# Patient Record
Sex: MALE | Race: ASIAN | NOT HISPANIC OR LATINO | URBAN - METROPOLITAN AREA
[De-identification: names, ages, dates, MRNs, and addresses within clinical notes are randomized per-mention and may not be internally consistent; named-entity substitution may affect disease eponyms.]

---

## 2022-02-21 ENCOUNTER — INPATIENT (INPATIENT)
Facility: HOSPITAL | Age: 42
LOS: 2 days | Discharge: HOME | End: 2022-02-24
Attending: STUDENT IN AN ORGANIZED HEALTH CARE EDUCATION/TRAINING PROGRAM | Admitting: STUDENT IN AN ORGANIZED HEALTH CARE EDUCATION/TRAINING PROGRAM
Payer: COMMERCIAL

## 2022-02-21 VITALS
SYSTOLIC BLOOD PRESSURE: 111 MMHG | HEART RATE: 101 BPM | OXYGEN SATURATION: 100 % | DIASTOLIC BLOOD PRESSURE: 73 MMHG | RESPIRATION RATE: 20 BRPM | TEMPERATURE: 98 F

## 2022-02-21 LAB
ALBUMIN SERPL ELPH-MCNC: 3.9 G/DL — SIGNIFICANT CHANGE UP (ref 3.3–5)
ALP SERPL-CCNC: 96 U/L — SIGNIFICANT CHANGE UP (ref 40–120)
ALT FLD-CCNC: 18 U/L — SIGNIFICANT CHANGE UP (ref 4–41)
ANION GAP SERPL CALC-SCNC: 17 MMOL/L — HIGH (ref 7–14)
APPEARANCE UR: CLEAR — SIGNIFICANT CHANGE UP
APTT BLD: 27.4 SEC — SIGNIFICANT CHANGE UP (ref 27–36.3)
AST SERPL-CCNC: 18 U/L — SIGNIFICANT CHANGE UP (ref 4–40)
BASE EXCESS BLDV CALC-SCNC: -4.9 MMOL/L — LOW (ref -2–3)
BASOPHILS # BLD AUTO: 0 K/UL — SIGNIFICANT CHANGE UP (ref 0–0.2)
BASOPHILS NFR BLD AUTO: 0 % — SIGNIFICANT CHANGE UP (ref 0–2)
BILIRUB SERPL-MCNC: 0.4 MG/DL — SIGNIFICANT CHANGE UP (ref 0.2–1.2)
BILIRUB UR-MCNC: NEGATIVE — SIGNIFICANT CHANGE UP
BLOOD GAS VENOUS COMPREHENSIVE RESULT: SIGNIFICANT CHANGE UP
BUN SERPL-MCNC: 10 MG/DL — SIGNIFICANT CHANGE UP (ref 7–23)
CALCIUM SERPL-MCNC: 8.8 MG/DL — SIGNIFICANT CHANGE UP (ref 8.4–10.5)
CHLORIDE BLDV-SCNC: 107 MMOL/L — SIGNIFICANT CHANGE UP (ref 96–108)
CHLORIDE SERPL-SCNC: 101 MMOL/L — SIGNIFICANT CHANGE UP (ref 98–107)
CO2 BLDV-SCNC: 19.1 MMOL/L — LOW (ref 22–26)
CO2 SERPL-SCNC: 19 MMOL/L — LOW (ref 22–31)
COLOR SPEC: YELLOW — SIGNIFICANT CHANGE UP
CREAT SERPL-MCNC: 1.02 MG/DL — SIGNIFICANT CHANGE UP (ref 0.5–1.3)
DIFF PNL FLD: ABNORMAL
EOSINOPHIL # BLD AUTO: 0 K/UL — SIGNIFICANT CHANGE UP (ref 0–0.5)
EOSINOPHIL NFR BLD AUTO: 0 % — SIGNIFICANT CHANGE UP (ref 0–6)
FLUAV AG NPH QL: SIGNIFICANT CHANGE UP
FLUBV AG NPH QL: SIGNIFICANT CHANGE UP
GAS PNL BLDV: 135 MMOL/L — LOW (ref 136–145)
GAS PNL BLDV: SIGNIFICANT CHANGE UP
GLUCOSE BLDV-MCNC: 108 MG/DL — HIGH (ref 70–99)
GLUCOSE SERPL-MCNC: 170 MG/DL — HIGH (ref 70–99)
GLUCOSE UR QL: ABNORMAL
HCO3 BLDV-SCNC: 18 MMOL/L — LOW (ref 22–29)
HCT VFR BLD CALC: 45.4 % — SIGNIFICANT CHANGE UP (ref 39–50)
HCT VFR BLDA CALC: 41 % — SIGNIFICANT CHANGE UP (ref 39–51)
HGB BLD CALC-MCNC: 13.8 G/DL — SIGNIFICANT CHANGE UP (ref 13–17)
HGB BLD-MCNC: 15.7 G/DL — SIGNIFICANT CHANGE UP (ref 13–17)
HIV 1+2 AB+HIV1 P24 AG SERPL QL IA: SIGNIFICANT CHANGE UP
IANC: 16.57 K/UL — HIGH (ref 1.5–8.5)
INR BLD: 1.18 RATIO — HIGH (ref 0.88–1.16)
KETONES UR-MCNC: NEGATIVE — SIGNIFICANT CHANGE UP
LACTATE BLDV-MCNC: 1.2 MMOL/L — SIGNIFICANT CHANGE UP (ref 0.5–2)
LEUKOCYTE ESTERASE UR-ACNC: ABNORMAL
LYMPHOCYTES # BLD AUTO: 0.46 K/UL — LOW (ref 1–3.3)
LYMPHOCYTES # BLD AUTO: 2.5 % — LOW (ref 13–44)
MCHC RBC-ENTMCNC: 29.9 PG — SIGNIFICANT CHANGE UP (ref 27–34)
MCHC RBC-ENTMCNC: 34.6 GM/DL — SIGNIFICANT CHANGE UP (ref 32–36)
MCV RBC AUTO: 86.5 FL — SIGNIFICANT CHANGE UP (ref 80–100)
MONOCYTES # BLD AUTO: 0.93 K/UL — HIGH (ref 0–0.9)
MONOCYTES NFR BLD AUTO: 5.1 % — SIGNIFICANT CHANGE UP (ref 2–14)
NEUTROPHILS # BLD AUTO: 16.84 K/UL — HIGH (ref 1.8–7.4)
NEUTROPHILS NFR BLD AUTO: 83.1 % — HIGH (ref 43–77)
NITRITE UR-MCNC: NEGATIVE — SIGNIFICANT CHANGE UP
PCO2 BLDV: 28 MMHG — LOW (ref 42–55)
PH BLDV: 7.42 — SIGNIFICANT CHANGE UP (ref 7.32–7.43)
PH UR: 6 — SIGNIFICANT CHANGE UP (ref 5–8)
PLATELET # BLD AUTO: 183 K/UL — SIGNIFICANT CHANGE UP (ref 150–400)
PO2 BLDV: 73 MMHG — SIGNIFICANT CHANGE UP
POTASSIUM BLDV-SCNC: 3.4 MMOL/L — LOW (ref 3.5–5.1)
POTASSIUM SERPL-MCNC: 3.7 MMOL/L — SIGNIFICANT CHANGE UP (ref 3.5–5.3)
POTASSIUM SERPL-SCNC: 3.7 MMOL/L — SIGNIFICANT CHANGE UP (ref 3.5–5.3)
PROT SERPL-MCNC: 7.2 G/DL — SIGNIFICANT CHANGE UP (ref 6–8.3)
PROT UR-MCNC: ABNORMAL
PROTHROM AB SERPL-ACNC: 13.3 SEC — SIGNIFICANT CHANGE UP (ref 10.6–13.6)
RBC # BLD: 5.25 M/UL — SIGNIFICANT CHANGE UP (ref 4.2–5.8)
RBC # FLD: 13.9 % — SIGNIFICANT CHANGE UP (ref 10.3–14.5)
RSV RNA NPH QL NAA+NON-PROBE: SIGNIFICANT CHANGE UP
SAO2 % BLDV: 96.1 % — SIGNIFICANT CHANGE UP
SARS-COV-2 RNA SPEC QL NAA+PROBE: SIGNIFICANT CHANGE UP
SODIUM SERPL-SCNC: 137 MMOL/L — SIGNIFICANT CHANGE UP (ref 135–145)
SP GR SPEC: 1.02 — SIGNIFICANT CHANGE UP (ref 1–1.05)
UROBILINOGEN FLD QL: SIGNIFICANT CHANGE UP
WBC # BLD: 18.23 K/UL — HIGH (ref 3.8–10.5)
WBC # FLD AUTO: 18.23 K/UL — HIGH (ref 3.8–10.5)

## 2022-02-21 PROCEDURE — 99218: CPT | Mod: 25

## 2022-02-21 PROCEDURE — 71045 X-RAY EXAM CHEST 1 VIEW: CPT | Mod: 26

## 2022-02-21 PROCEDURE — 93010 ELECTROCARDIOGRAM REPORT: CPT

## 2022-02-21 RX ORDER — SODIUM CHLORIDE 9 MG/ML
2000 INJECTION INTRAMUSCULAR; INTRAVENOUS; SUBCUTANEOUS ONCE
Refills: 0 | Status: COMPLETED | OUTPATIENT
Start: 2022-02-21 | End: 2022-02-21

## 2022-02-21 RX ORDER — SODIUM CHLORIDE 9 MG/ML
1000 INJECTION INTRAMUSCULAR; INTRAVENOUS; SUBCUTANEOUS
Refills: 0 | Status: DISCONTINUED | OUTPATIENT
Start: 2022-02-21 | End: 2022-02-22

## 2022-02-21 RX ORDER — ACETAMINOPHEN 500 MG
1000 TABLET ORAL ONCE
Refills: 0 | Status: COMPLETED | OUTPATIENT
Start: 2022-02-21 | End: 2022-02-21

## 2022-02-21 RX ORDER — ACETAMINOPHEN 500 MG
650 TABLET ORAL ONCE
Refills: 0 | Status: COMPLETED | OUTPATIENT
Start: 2022-02-21 | End: 2022-02-21

## 2022-02-21 RX ORDER — VANCOMYCIN HCL 1 G
1000 VIAL (EA) INTRAVENOUS ONCE
Refills: 0 | Status: COMPLETED | OUTPATIENT
Start: 2022-02-21 | End: 2022-02-21

## 2022-02-21 RX ORDER — CEFTRIAXONE 500 MG/1
1000 INJECTION, POWDER, FOR SOLUTION INTRAMUSCULAR; INTRAVENOUS ONCE
Refills: 0 | Status: COMPLETED | OUTPATIENT
Start: 2022-02-21 | End: 2022-02-21

## 2022-02-21 RX ORDER — PIPERACILLIN AND TAZOBACTAM 4; .5 G/20ML; G/20ML
3.38 INJECTION, POWDER, LYOPHILIZED, FOR SOLUTION INTRAVENOUS ONCE
Refills: 0 | Status: COMPLETED | OUTPATIENT
Start: 2022-02-21 | End: 2022-02-21

## 2022-02-21 RX ORDER — SODIUM CHLORIDE 9 MG/ML
1000 INJECTION INTRAMUSCULAR; INTRAVENOUS; SUBCUTANEOUS ONCE
Refills: 0 | Status: COMPLETED | OUTPATIENT
Start: 2022-02-21 | End: 2022-02-21

## 2022-02-21 RX ADMIN — Medication 650 MILLIGRAM(S): at 21:54

## 2022-02-21 RX ADMIN — SODIUM CHLORIDE 1000 MILLILITER(S): 9 INJECTION INTRAMUSCULAR; INTRAVENOUS; SUBCUTANEOUS at 17:20

## 2022-02-21 RX ADMIN — Medication 1000 MILLIGRAM(S): at 13:53

## 2022-02-21 RX ADMIN — Medication 650 MILLIGRAM(S): at 22:30

## 2022-02-21 RX ADMIN — Medication 250 MILLIGRAM(S): at 14:36

## 2022-02-21 RX ADMIN — SODIUM CHLORIDE 2000 MILLILITER(S): 9 INJECTION INTRAMUSCULAR; INTRAVENOUS; SUBCUTANEOUS at 13:53

## 2022-02-21 RX ADMIN — PIPERACILLIN AND TAZOBACTAM 200 GRAM(S): 4; .5 INJECTION, POWDER, LYOPHILIZED, FOR SOLUTION INTRAVENOUS at 13:54

## 2022-02-21 RX ADMIN — SODIUM CHLORIDE 150 MILLILITER(S): 9 INJECTION INTRAMUSCULAR; INTRAVENOUS; SUBCUTANEOUS at 20:55

## 2022-02-21 RX ADMIN — SODIUM CHLORIDE 150 MILLILITER(S): 9 INJECTION INTRAMUSCULAR; INTRAVENOUS; SUBCUTANEOUS at 19:39

## 2022-02-21 RX ADMIN — CEFTRIAXONE 100 MILLIGRAM(S): 500 INJECTION, POWDER, FOR SOLUTION INTRAMUSCULAR; INTRAVENOUS at 20:54

## 2022-02-21 NOTE — ED ADULT NURSE REASSESSMENT NOTE - NS ED NURSE REASSESS COMMENT FT1
Received report from previous shift RN juice, pt is AOX4, pt appears comfortable and verbalized relief. Non-diaphoretic, continues to be sinus tachy on tele, 120s. Denies any chest pain or SOB. Pending CT

## 2022-02-21 NOTE — ED CDU PROVIDER INITIAL DAY NOTE - OBJECTIVE STATEMENT
41 Y M no pPMHx presenting with dysuria, rigors, chills. States over the last 2 days experiencing rigors, chills, painful urination with decreased output. Mild associated abdominal pain, + fevers subjectively. Denies any chest pain, difficulty breathing, LE edema, cough, rhinorrhea, changes in vision.    CDU Note: 42 y/o m no PMH presents to ER c/o fever chills and dysuria. In Er wbc 18, fever and tacchycardia with positive UA. treated for urosepsis with vanc/zosyn ivf x 3L and tylenol Symptoms and vitals improved - sent to CDU for further treatment - iv abx, iv fluids repeat labs am. Pt. currently resting comfortably.

## 2022-02-21 NOTE — ED PROVIDER NOTE - NS ED ROS FT
GENERAL: No fever or chills  EYES: No change in vision  HEENT: No trouble swallowing or speaking  CARDIAC: No chest pain  PULMONARY: No cough or SOB  GI: + suprapubic abdominal pain, no nausea or no vomiting, no diarrhea or constipation  : + pain with urination, + decreased urine output  SKIN: No rashes  NEURO: No headache, no numbness  MSK: No joint pain  Otherwise as HPI or negative.

## 2022-02-21 NOTE — ED PROCEDURE NOTE - ATTENDING CONTRIBUTION TO CARE
I Salvatore Cervantes MD discussed the procedure with the resident and or ACP and went over risks and benefits as well as indications for the procedure. I was present for key portions of the procedure itself and assisted as needed.

## 2022-02-21 NOTE — ED PROVIDER NOTE - ATTENDING CONTRIBUTION TO CARE
I performed a history and physical exam of the patient and discussed their management with the resident and /or advanced care provider. I reviewed the resident and /or ACP's note and agree with the documented findings and plan of care. My medical decision making and observations are found above.  Lungs clear, abd soft and non tender.

## 2022-02-21 NOTE — ED PROVIDER NOTE - OBJECTIVE STATEMENT
41 Y M no pPMHx presenting with dysuria, rigors, chills. States over the last 2 days experiencing rigors, chills, painful urination with decreased output. Mild associated abdominal pain, + fevers subjectively. Denies any chest pain, difficulty breathing, LE edema, cough, rhinorrhea, changes in vision.

## 2022-02-21 NOTE — ED ADULT NURSE NOTE - NSIMPLEMENTINTERV_GEN_ALL_ED
Implemented All Universal Safety Interventions:  [place] to call system. Call bell, personal items and telephone within reach. Instruct patient to call for assistance. Room bathroom lighting operational. Non-slip footwear when patient is off stretcher. Physically safe environment: no spills, clutter or unnecessary equipment. Stretcher in lowest position, wheels locked, appropriate side rails in place.

## 2022-02-21 NOTE — ED PROVIDER NOTE - PHYSICAL EXAMINATION
Physical Exam:  General: rigorous  Eyes: EOMI, Conjunctiva and sclera clear  Neck: No JVD  Lungs: Clear to auscultation bilaterally, no wheeze, no rhonchi  Heart: Normal S1, S2, no murmurs  Abdomen: Soft, nontender, nondistended, no CVA tenderness  : scrotal peripenile swelling predominately on inferior portion   Extremities: 2+ peripheral pulses, no edema  Psych: AAO X3  Neurologic: Non-focal, GRIFFIN

## 2022-02-21 NOTE — ED CDU PROVIDER INITIAL DAY NOTE - ATTENDING CONTRIBUTION TO CARE
I Salvatore Cervantes MD have personally performed a face to face diagnostic evaluation on this patient.  I have reviewed the ACP note and agree with the history, exam, and plan of care, except as noted.   My medical decison making and observations are found above.  The patient is stable for CDU.  Lungs clear, awake and talking

## 2022-02-21 NOTE — ED ADULT NURSE NOTE - OBJECTIVE STATEMENT
pt arrives to room #12 with c/o fever and painful urination x 2 days. pt aox4, rigors noted. rectal temp as noted. foreskin noted to be swollen and hard. denies penile trauma. denies cp, sob, n/v/d. IV placed, labs drawn and sent. will cont to monitor.

## 2022-02-21 NOTE — ED CDU PROVIDER INITIAL DAY NOTE - MEDICAL DECISION MAKING DETAILS
42 y/o male w/ UTI   -iv fluids  -iv abx  -pain control  -repeat labs am 40 y/o male w/ UTI   -iv fluids  -iv abx  -pain control  -repeat labs am  Billy: significant upper tract UTI (pyelo) high fever and looking ill. will give antibiotics and assess stability.

## 2022-02-21 NOTE — ED PROVIDER NOTE - CLINICAL SUMMARY MEDICAL DECISION MAKING FREE TEXT BOX
41 Y M presenting with dysuria, chills, tachycardiac, primary concern for urinary infection, no acute abdominal tenderness concerning for stone, environmental cooling, common labs, antibiotics, re-assess for improvement. 41 Y M presenting with dysuria, chills, tachycardiac, primary concern for urinary infection, no acute abdominal tenderness concerning for stone, environmental cooling, common labs, antibiotics, re-assess for improvement.  Billy: 40yo who presents with fever for a few days and dysuria, and body aches. will check prostate, High fever here. Will go sepsis pathway. awaiting labs and urine. no sob or cough.

## 2022-02-22 ENCOUNTER — TRANSCRIPTION ENCOUNTER (OUTPATIENT)
Age: 42
End: 2022-02-22

## 2022-02-22 DIAGNOSIS — A41.9 SEPSIS, UNSPECIFIED ORGANISM: ICD-10-CM

## 2022-02-22 DIAGNOSIS — N48.89 OTHER SPECIFIED DISORDERS OF PENIS: ICD-10-CM

## 2022-02-22 DIAGNOSIS — N39.0 URINARY TRACT INFECTION, SITE NOT SPECIFIED: ICD-10-CM

## 2022-02-22 DIAGNOSIS — F17.200 NICOTINE DEPENDENCE, UNSPECIFIED, UNCOMPLICATED: ICD-10-CM

## 2022-02-22 DIAGNOSIS — Z29.9 ENCOUNTER FOR PROPHYLACTIC MEASURES, UNSPECIFIED: ICD-10-CM

## 2022-02-22 LAB
ALBUMIN SERPL ELPH-MCNC: 2.9 G/DL — LOW (ref 3.3–5)
ALP SERPL-CCNC: 58 U/L — SIGNIFICANT CHANGE UP (ref 40–120)
ALT FLD-CCNC: 10 U/L — SIGNIFICANT CHANGE UP (ref 4–41)
ANION GAP SERPL CALC-SCNC: 8 MMOL/L — SIGNIFICANT CHANGE UP (ref 7–14)
AST SERPL-CCNC: 13 U/L — SIGNIFICANT CHANGE UP (ref 4–40)
BASE EXCESS BLDV CALC-SCNC: -5.2 MMOL/L — LOW (ref -2–3)
BASOPHILS # BLD AUTO: 0.09 K/UL — SIGNIFICANT CHANGE UP (ref 0–0.2)
BASOPHILS NFR BLD AUTO: 0.5 % — SIGNIFICANT CHANGE UP (ref 0–2)
BILIRUB SERPL-MCNC: 0.4 MG/DL — SIGNIFICANT CHANGE UP (ref 0.2–1.2)
BLOOD GAS VENOUS COMPREHENSIVE RESULT: SIGNIFICANT CHANGE UP
BUN SERPL-MCNC: 9 MG/DL — SIGNIFICANT CHANGE UP (ref 7–23)
CALCIUM SERPL-MCNC: 7.6 MG/DL — LOW (ref 8.4–10.5)
CHLORIDE BLDV-SCNC: 108 MMOL/L — SIGNIFICANT CHANGE UP (ref 96–108)
CHLORIDE SERPL-SCNC: 112 MMOL/L — HIGH (ref 98–107)
CO2 BLDV-SCNC: 19.9 MMOL/L — LOW (ref 22–26)
CO2 SERPL-SCNC: 18 MMOL/L — LOW (ref 22–31)
CREAT SERPL-MCNC: 0.82 MG/DL — SIGNIFICANT CHANGE UP (ref 0.5–1.3)
CULTURE RESULTS: SIGNIFICANT CHANGE UP
EOSINOPHIL # BLD AUTO: 0.04 K/UL — SIGNIFICANT CHANGE UP (ref 0–0.5)
EOSINOPHIL NFR BLD AUTO: 0.2 % — SIGNIFICANT CHANGE UP (ref 0–6)
GAS PNL BLDV: 136 MMOL/L — SIGNIFICANT CHANGE UP (ref 136–145)
GLUCOSE BLDV-MCNC: 99 MG/DL — SIGNIFICANT CHANGE UP (ref 70–99)
GLUCOSE SERPL-MCNC: 101 MG/DL — HIGH (ref 70–99)
HCO3 BLDV-SCNC: 19 MMOL/L — LOW (ref 22–29)
HCT VFR BLD CALC: 39.1 % — SIGNIFICANT CHANGE UP (ref 39–50)
HCT VFR BLDA CALC: 41 % — SIGNIFICANT CHANGE UP (ref 39–51)
HGB BLD CALC-MCNC: 13.6 G/DL — SIGNIFICANT CHANGE UP (ref 13–17)
HGB BLD-MCNC: 13.6 G/DL — SIGNIFICANT CHANGE UP (ref 13–17)
IANC: 15.33 K/UL — HIGH (ref 1.5–8.5)
IMM GRANULOCYTES NFR BLD AUTO: 1.3 % — SIGNIFICANT CHANGE UP (ref 0–1.5)
LACTATE BLDV-MCNC: 1.2 MMOL/L — SIGNIFICANT CHANGE UP (ref 0.5–2)
LYMPHOCYTES # BLD AUTO: 1.72 K/UL — SIGNIFICANT CHANGE UP (ref 1–3.3)
LYMPHOCYTES # BLD AUTO: 9.1 % — LOW (ref 13–44)
MCHC RBC-ENTMCNC: 29.9 PG — SIGNIFICANT CHANGE UP (ref 27–34)
MCHC RBC-ENTMCNC: 34.8 GM/DL — SIGNIFICANT CHANGE UP (ref 32–36)
MCV RBC AUTO: 85.9 FL — SIGNIFICANT CHANGE UP (ref 80–100)
MONOCYTES # BLD AUTO: 1.5 K/UL — HIGH (ref 0–0.9)
MONOCYTES NFR BLD AUTO: 7.9 % — SIGNIFICANT CHANGE UP (ref 2–14)
NEUTROPHILS # BLD AUTO: 15.33 K/UL — HIGH (ref 1.8–7.4)
NEUTROPHILS NFR BLD AUTO: 81 % — HIGH (ref 43–77)
NRBC # BLD: 0 /100 WBCS — SIGNIFICANT CHANGE UP
NRBC # FLD: 0 K/UL — SIGNIFICANT CHANGE UP
PCO2 BLDV: 32 MMHG — LOW (ref 42–55)
PH BLDV: 7.38 — SIGNIFICANT CHANGE UP (ref 7.32–7.43)
PLATELET # BLD AUTO: 153 K/UL — SIGNIFICANT CHANGE UP (ref 150–400)
PO2 BLDV: 33 MMHG — SIGNIFICANT CHANGE UP
POTASSIUM BLDV-SCNC: 3.7 MMOL/L — SIGNIFICANT CHANGE UP (ref 3.5–5.1)
POTASSIUM SERPL-MCNC: 3.7 MMOL/L — SIGNIFICANT CHANGE UP (ref 3.5–5.3)
POTASSIUM SERPL-SCNC: 3.7 MMOL/L — SIGNIFICANT CHANGE UP (ref 3.5–5.3)
PROT SERPL-MCNC: 6 G/DL — SIGNIFICANT CHANGE UP (ref 6–8.3)
RBC # BLD: 4.55 M/UL — SIGNIFICANT CHANGE UP (ref 4.2–5.8)
RBC # FLD: 14 % — SIGNIFICANT CHANGE UP (ref 10.3–14.5)
SAO2 % BLDV: 59.1 % — SIGNIFICANT CHANGE UP
SODIUM SERPL-SCNC: 138 MMOL/L — SIGNIFICANT CHANGE UP (ref 135–145)
SPECIMEN SOURCE: SIGNIFICANT CHANGE UP
WBC # BLD: 18.93 K/UL — HIGH (ref 3.8–10.5)
WBC # FLD AUTO: 18.93 K/UL — HIGH (ref 3.8–10.5)

## 2022-02-22 PROCEDURE — 99217: CPT

## 2022-02-22 PROCEDURE — 99223 1ST HOSP IP/OBS HIGH 75: CPT | Mod: GC

## 2022-02-22 RX ORDER — ACETAMINOPHEN 500 MG
650 TABLET ORAL ONCE
Refills: 0 | Status: COMPLETED | OUTPATIENT
Start: 2022-02-22 | End: 2022-02-22

## 2022-02-22 RX ORDER — CEFTRIAXONE 500 MG/1
1000 INJECTION, POWDER, FOR SOLUTION INTRAMUSCULAR; INTRAVENOUS EVERY 24 HOURS
Refills: 0 | Status: DISCONTINUED | OUTPATIENT
Start: 2022-02-22 | End: 2022-02-23

## 2022-02-22 RX ORDER — ACETAMINOPHEN 500 MG
650 TABLET ORAL EVERY 6 HOURS
Refills: 0 | Status: DISCONTINUED | OUTPATIENT
Start: 2022-02-22 | End: 2022-02-24

## 2022-02-22 RX ORDER — ENOXAPARIN SODIUM 100 MG/ML
40 INJECTION SUBCUTANEOUS DAILY
Refills: 0 | Status: DISCONTINUED | OUTPATIENT
Start: 2022-02-22 | End: 2022-02-24

## 2022-02-22 RX ORDER — NICOTINE POLACRILEX 2 MG
1 GUM BUCCAL DAILY
Refills: 0 | Status: DISCONTINUED | OUTPATIENT
Start: 2022-02-22 | End: 2022-02-24

## 2022-02-22 RX ADMIN — Medication 650 MILLIGRAM(S): at 17:36

## 2022-02-22 RX ADMIN — Medication 650 MILLIGRAM(S): at 16:27

## 2022-02-22 RX ADMIN — SODIUM CHLORIDE 150 MILLILITER(S): 9 INJECTION INTRAMUSCULAR; INTRAVENOUS; SUBCUTANEOUS at 06:39

## 2022-02-22 RX ADMIN — Medication 1 APPLICATION(S): at 17:43

## 2022-02-22 RX ADMIN — Medication 650 MILLIGRAM(S): at 09:25

## 2022-02-22 RX ADMIN — Medication 100 MILLIGRAM(S): at 16:27

## 2022-02-22 RX ADMIN — CEFTRIAXONE 100 MILLIGRAM(S): 500 INJECTION, POWDER, FOR SOLUTION INTRAMUSCULAR; INTRAVENOUS at 21:24

## 2022-02-22 RX ADMIN — Medication 650 MILLIGRAM(S): at 07:27

## 2022-02-22 NOTE — H&P ADULT - ATTENDING COMMENTS
41 year old male current smoker, no reported PMH presenting with dysuria and chills, currently admitted for sepsis likely 2/2 UTI.     -patient clinically improving, reports dysuria has resolved  -penile and foreskin swelling noted on exam, no tenderness, erythema or discharge noted  -c/w CTX and doxycycline for UTI and STI coverage  -check urine gonorrhea and chlamydia, patient consented  -leukocytosis stable, continue to trend  -f/u blood and urine cultures  -if clinical status worsens can check CTAP    D/w Dr. Alfaro

## 2022-02-22 NOTE — DISCHARGE NOTE PROVIDER - NSDCMRMEDTOKEN_GEN_ALL_CORE_FT
amoxicillin-clavulanate 875 mg-125 mg oral tablet: 1 tab(s) orally 2 times a day  betamethasone valerate 0.1% topical cream: 1 application topically once a day as needed until swelling stops for no more than 2 weeks

## 2022-02-22 NOTE — DISCHARGE NOTE PROVIDER - NSDCCPCAREPLAN_GEN_ALL_CORE_FT
PRINCIPAL DISCHARGE DIAGNOSIS  Diagnosis: Acute UTI  Assessment and Plan of Treatment: You came to the hospital because you were feeling fevers, chills, and burning with urination. We did studies of your urina samples and you were found to have a urinary tract infection. We treated you with antibiotics in the hospital called ceftriaxone. You will need to continue oral antibiotics for ___ more days. If you experience burning with urination or fevers or chills after discharge, please seek medical attention.   --  We have recommended a clinic if you wish to follow with a primary care provider after discharge.      SECONDARY DISCHARGE DIAGNOSES  Diagnosis: Penile swelling  Assessment and Plan of Treatment: You noted swelling of your penis, which was associated with burning with urination. We prescribed steroid cream to apply to your penis to decrease inflammation and swelling. Please continue to apply this cream daily for a total of 4 weeks. From 2/22/22-3/22/22.     PRINCIPAL DISCHARGE DIAGNOSIS  Diagnosis: Acute UTI  Assessment and Plan of Treatment: You came to the hospital because you were feeling fevers, chills, and burning with urination. We did studies of your urine samples and you were found to have a urinary tract infection with bacteria growing. We treated you with antibiotics in the hospital called ceftriaxone. You will need to continue oral antibiotics called Augmentin. If you experience worsening burning with urination or fevers or chills after discharge, please seek medical attention.   --  We have recommended a clinic if you wish to follow with a primary care provider after discharge.      SECONDARY DISCHARGE DIAGNOSES  Diagnosis: Penile swelling  Assessment and Plan of Treatment: You noted swelling of your penis, which was associated with burning with urination. We prescribed steroid cream to apply to your penis to decrease inflammation and swelling. Please continue to apply this cream daily for a total of 4 weeks, from 2/22/22 to 3/22/22.     PRINCIPAL DISCHARGE DIAGNOSIS  Diagnosis: Acute UTI  Assessment and Plan of Treatment: You came to the hospital because you were feeling fevers, chills, and burning with urination. We did studies of your urine samples and you were found to have a urinary tract infection with bacteria growing. We treated you with antibiotics in the hospital called ceftriaxone. You will need to continue oral antibiotics called Augmentin. If you experience worsening burning with urination or fevers or chills after discharge, please seek medical attention.   --  We have recommended a clinic if you wish to follow with a primary care provider after discharge for high blood pressure, cholesterol, and pre-diabetes      SECONDARY DISCHARGE DIAGNOSES  Diagnosis: Penile swelling  Assessment and Plan of Treatment: You noted swelling of your penis, which was associated with burning with urination. We prescribed steroid cream to apply to your penis to decrease inflammation and swelling. Please continue to apply this cream daily for a total of 2 weeks until swelling stops as needed

## 2022-02-22 NOTE — H&P ADULT - PROBLEM SELECTOR PLAN 1
- Patient febrile, tachycardic, WBC 18, lactate 3.7 on admission   - UA showing +leuk esterase and many bacteria   - S/p Vanc, Zosyn, Ceftriaxone and 3L IVF bolus in ED   - Continue Ceftriaxone  - F/u blood and urine cultures - Patient febrile, tachycardic, neutrophil-predominant leukocytosis to 18, lactate 3.7 on admission   - UA showing +leuk esterase and many bacteria   - Likely bacterial cause of UTI, less likely sexually transmitted as patient is monogamous and no history of STI   - S/p Vanc, Zosyn, Ceftriaxone and 3L IVF bolus in ED   - Continue Ceftriaxone  - F/u blood and urine cultures - E. coli would be the most likely cause of UTI in men over 35, however cannot exclude STI at this time especially in a immunocompetent male with no known medical conditions. Will cover for empirically cover for gonorrhea/chlamydia.  - Patient febrile, tachycardic, neutrophil-predominant leukocytosis to 18 with bandemia, lactate 3.7 on admission   - UA showing +leuk esterase and many bacteria   - HIV negative   - S/p Vanc, Zosyn, Ceftriaxone and 3L IVF bolus in ED   - Continue Ceftriaxone 1g qdaily for 7-day course (2/21-2/27)   - Continue Doxycycline 100mg BID for 7-day course (2/22-2/28) for empiric chlamydia treatment  - F/u blood culture, urine culture, gonorrhea/chlamydia

## 2022-02-22 NOTE — H&P ADULT - HISTORY OF PRESENT ILLNESS
40yo M active smoker with no known PMH presents with fevers, chills, and dysuria. Two days prior to admission, patient had sudden onset of chills, subjective fevers, and pain/burning on urination. Patient did not Patient took two doses of Tylenol with mild relief.     Patient got tested for COVID the day prior to admission, but results were negative. Denies sick contacts.   Patient states that he is monogamous and only sexually active with wife, no prior se    On admission, vitals T 100.5F, , /84, RR 17, SpO2 100% on room air.   S/p Vanc and Zosyn x1 and 3L IVF bolus in ED  40yo M active 10-pack year smoker with no known PMH presents with fevers, chills, and dysuria. Two days prior to admission, patient had sudden onset of chills, subjective fevers, and pain/burning on urination. Symptoms associated with suprapubic abdominal pain, palpitations, and swollen penis. Suprapubic pain described as intermittent, sharp, non-radiating, rated 5/10. Patient took two doses of Tylenol with mild relief. Patient got tested for COVID the day prior to admission, but results were negative. Denies sick contacts; patient COVID vaccinated x2. No penile trauma or hematuria. Patient states that he is monogamous and only sexually active with wife, last sexual encounter 4 months ago as his wife has been on vacation during that time. Denies previous sexual encounters prior to wife or STIs. Denies confusion, dizziness, weakness, lethargy, nausea, vomiting, constipation, diarrhea, melena.     Patient has follow with a doctor regularly in the past, but currently does not have a PCP since changing insurances. Will need referral to PCP upon discharge.      On admission, vitals T 100.5F, , /84, RR 17, SpO2 100% on room air.   S/p Vanc and Zosyn x1 and 3L IVF bolus in ED  40yo M active 10-pack year smoker with no known PMH presents with fevers, chills, and dysuria. Two days prior to admission, patient had sudden onset of chills, subjective fevers, and pain/burning on urination. Symptoms associated with suprapubic abdominal pain, palpitations, and swollen penis. Suprapubic pain described as intermittent, sharp, non-radiating, rated 5/10. Patient took two doses of Tylenol with mild relief. Patient got tested for COVID the day prior to admission, but results were negative. Denies sick contacts; patient COVID vaccinated x2. No penile trauma or hematuria. Patient states that he is monogamous and only sexually active with wife, last sexual encounter 4 months ago as his wife has been on vacation during that time. Denies previous sexual encounters prior to wife or STIs. Denies confusion, dizziness, weakness, lethargy, cough, SOB, nausea, vomiting, constipation, diarrhea, melena.     Patient has follow with a doctor regularly in the past, but currently does not have a PCP since changing insurances. Will need referral to PCP upon discharge.      On admission, vitals T 100.5F, , /84, RR 17, SpO2 100% on room air.   S/p Vanc, Zosyn, Ceftriaxone x1 and 3L IVF bolus in ED  40yo M active 10-pack year smoker with no known PMH presents with fevers, chills, and dysuria. Two days prior to admission, patient had sudden onset of chills, subjective fevers, and pain/burning on urination. Symptoms associated with suprapubic abdominal pain, palpitations, and swollen penis. Suprapubic pain described as intermittent, sharp, non-radiating, rated 5/10. Patient took two doses of Tylenol with mild relief. Patient has not experienced similar symptoms before. Patient got tested for COVID the day prior to admission, but results were negative. Denies sick contacts; patient COVID vaccinated x2. No penile trauma or hematuria. Patient states that he is monogamous and only sexually active with wife, last sexual encounter 4 months ago as his wife has been on vacation during that time. Does not use contraception with wife. Denies previous sexual encounters prior to wife or history of STIs. Patient cleans genital area daily with bar soap. Denies confusion, dizziness, weakness, lethargy, cough, SOB, nausea, vomiting, constipation, diarrhea, melena.     Patient has follow with a doctor regularly in the past, but currently does not have a PCP since changing insurances. Will need referral to PCP upon discharge.      On admission, vitals T 100.5F, , /84, RR 17, SpO2 100% on room air.   S/p Vanc, Zosyn, Ceftriaxone x1 and 3L IVF bolus in ED

## 2022-02-22 NOTE — H&P ADULT - PROBLEM SELECTOR PLAN 2
- Active 1/2 pack per day smoking for 20 years   - Patient counseled on smoking cessation   - Continue nicotine patch - Differential includes phimosis vs. balantitis  - Start betamethasone topical to affected area daily for 4 weeks

## 2022-02-22 NOTE — ED CDU PROVIDER SUBSEQUENT DAY NOTE - HISTORY
41 y.o M presenting with urosepsis 2/2 UTi accepted to CDU for IV abx and monitoring. In interim patient is resting comfortably, vitals stable, with no acute complaints. patient pending AM labs and reassessment.

## 2022-02-22 NOTE — PHARMACOTHERAPY INTERVENTION NOTE - COMMENTS
Medication history is complete. Medication list updated in Outpatient Medication Record ([facility]). Please call spectra [phone] if you have any questions.

## 2022-02-22 NOTE — H&P ADULT - ASSESSMENT
40yo M active 10-pack year smoker with no known PMH presents with suprapubic pain and dysuria. Admitted for sepsis 2/2 UTI.  40yo M active 10-pack year smoker with no known PMH presents with suprapubic pain and dysuria. Admitted for sepsis 2/2 UTI and penile swelling.

## 2022-02-22 NOTE — ED CDU PROVIDER DISPOSITION NOTE - ATTENDING CONTRIBUTION TO CARE
Amanda: I have seen and examined the patient face to face, have reviewed and addended the HPI, PE and a/p as necessary.     42 yo M a/w sepsis 2/2 UTI.  Pt started on ceftriaxone after receiving vanco/zosyn.  Pt placed into CDU for IV abx and monitoring. Pt noted to be slightly tachypneic on rounds, reports still feeling sick, but improved from yesterday.      GEN - NAD; well appearing; A+O x3; non-toxic appearing  CARD -s1s2, RRR, no M,G,R;   PULM - CTA b/l, symmetric breath sounds;   ABD -  +BS, ND, NT, soft, no guarding, no rebound, no masses;   BACK - no CVA tenderness, Normal  spine;   EXT - symmetric pulses, 2+ dp, capillary refill < 2 seconds, no cyanosis, no edema;   NEURO - no focal neuro deficits, no slurred speech    D/c fluids, continue antibiotics.  Admit to medicine for UTI and sepsis.

## 2022-02-22 NOTE — H&P ADULT - NSHPSOCIALHISTORY_GEN_ALL_CORE
Patient lives with wife, does not have kids. Works at [employer] office. Ambulatory and independent of ADLs at baseline. Endorses 20 year smoking history, 1/2 pack per day. Rare alcohol use only during celebrations. Denies illicit drug use.

## 2022-02-22 NOTE — H&P ADULT - NSHPPHYSICALEXAM_GEN_ALL_CORE
* exam performed with male provider at bedside    GENERAL: NAD, well-groomed, well-developed  HEAD:  Atraumatic, Normocephalic  EYES: EOMI, PERRLA, conjunctiva and sclera clear  ENMT: Moist mucous membranes  NECK: Supple, No JVD  CHEST/LUNG: Clear to auscultation bilaterally; No rales, rhonchi, wheezing, or rubs  HEART: Regular rate and rhythm; No murmurs, rubs, or gallops  ABDOMEN: Soft, Nontender, Nondistended; Bowel sounds present  EXTREMITIES:  2+ Peripheral Pulses, No clubbing, cyanosis, or edema  : Penile swelling. Non-erythematous, non-tender, no discharge. No scrotal swelling or tenderness.*   SKIN: No rashes or lesions  NERVOUS SYSTEM:  Alert & Oriented X4, Good concentration  PSYCH: Normal Affect. Speaking in Full Sentences. Laying in bed comfortably; not agitated * exam performed with male provider at bedside    GENERAL: NAD, well-groomed, well-developed  HEAD:  Atraumatic, Normocephalic  EYES: EOMI, PERRLA, conjunctiva and sclera clear  ENMT: Moist mucous membranes  NECK: Supple, No JVD  CHEST/LUNG: Clear to auscultation bilaterally; No rales, rhonchi, wheezing, or rubs  HEART: Regular rate and rhythm; No murmurs, rubs, or gallops  ABDOMEN: Soft, Nontender, Nondistended; Bowel sounds present  EXTREMITIES:  2+ Peripheral Pulses, No clubbing, cyanosis, or edema  : Penile swelling, especially inferior portion of crown. Non-erythematous, non-tender, no discharge expressed. No scrotal swelling or tenderness.*   SKIN: No rashes or lesions  NERVOUS SYSTEM:  Alert & Oriented X4, Good concentration  PSYCH: Normal Affect. Speaking in Full Sentences. Laying in bed comfortably; not agitated

## 2022-02-22 NOTE — DISCHARGE NOTE PROVIDER - NSFOLLOWUPCLINICS_GEN_ALL_ED_FT
[facility]  Gastroenterology  [address] [place] [address]  Phone: [phone]  Fax:

## 2022-02-22 NOTE — ED CDU PROVIDER DISPOSITION NOTE - CLINICAL COURSE
40 y/o m no PMH presents to ER c/o fever chills and dysuria. In Er wbc 18, fever and tachycardia with positive UA. treated for urosepsis with vanc/zosyn ivf x 3L and tylenol. Symptoms and vitals improved - sent to CDU for further treatment - iv abx, iv fluids repeat labs am. Pt appearing diaphoretic and mildly tachypneic, feeling better but still feeling ill. Admitted for iv antibiotics.

## 2022-02-22 NOTE — H&P ADULT - PROBLEM SELECTOR PLAN 3
DVT ppx: Lovenox   Diet: Regular   Dispo: Home   Will need PCP referral upon discharge - Active 1/2 pack per day smoking for 20 years   - Patient counseled on smoking cessation   - Continue nicotine patch

## 2022-02-22 NOTE — DISCHARGE NOTE PROVIDER - HOSPITAL COURSE
42yo M active 10-pack year smoker with no known PMH presents with suprapubic pain and dysuria. Admitted for sepsis 2/2 UTI and penile swelling. UA positive for leuk esterase and many bacteria. Urine culture and blood culture _______. Patient treated with 7-day course of ceftriaxone and doxycycline for UTI and topical betamethasone for phimosis.     Patient hemodynamically stable for discharge.    42yo M active 10-pack year smoker with no known PMH presents with suprapubic pain and dysuria. Admitted for sepsis 2/2 UTI and penile swelling. UA positive for leuk esterase and many bacteria. Urine culture growing GBS and blood culture NGTD. Patient treated with 7-day course of ceftriaxone and doxycycline for UTI and topical betamethasone for phimosis.     Patient hemodynamically stable for discharge.    42yo M active 10-pack year smoker with no known PMH presents with suprapubic pain and dysuria. Admitted for sepsis 2/2 UTI and penile swelling. UA positive for leuk esterase and many bacteria. Urine culture growing GBS and blood culture NGTD. HIV and gonorrhea/chlamydia negative. Patient treated with ceftriaxone for UTI and topical betamethasone for phimosis. Patient to be sent home on Augmentin for a total of 7-days antibiotics and topical betamethasone for 4 weeks. Of note, patient transiently had 4 watery bowel movements, C. diff PCR sent and showed _____________________.    Patient hemodynamically stable for discharge.    42yo M active 10-pack year smoker with no known PMH presents with suprapubic pain and dysuria. Admitted for sepsis 2/2 UTI and penile swelling. UA positive for leuk esterase and many bacteria. Urine culture growing GBS and blood culture NGTD. HIV and gonorrhea/chlamydia negative. Patient treated with ceftriaxone for UTI and topical betamethasone for phimosis. Patient to be sent home on Augmentin for a total of 7-days antibiotics and topical betamethasone for 4 weeks. Of note, patient transiently had 4 watery bowel movements, C. diff PCR sent and showed nothing.     Patient hemodynamically stable for discharge.    40yo M active 10-pack year smoker with no known PMH presents with suprapubic pain and dysuria. Admitted for sepsis 2/2 UTI and penile swelling. UA positive for leuk esterase and many bacteria. Urine culture growing GBS and blood culture NGTD. HIV and gonorrhea/chlamydia negative. Patient treated with ceftriaxone for UTI and topical betamethasone for phimosis. Patient to be sent home on Augmentin for a total of 7-days antibiotics and topical betamethasone for 2 weeks. Of note, patient transiently had 4 watery bowel movements, C. diff PCR sent and showed nothing.     Patient hemodynamically stable for discharge.

## 2022-02-22 NOTE — ED CDU PROVIDER SUBSEQUENT DAY NOTE - ATTENDING CONTRIBUTION TO CARE
Amanda: I have seen and examined the patient face to face, have reviewed and addended the HPI, PE and a/p as necessary.     42 yo M a/w urosepsis 2/2 UTI.  Pt started on ceftriaxone after receiving vanco/zosyn.  Pt placed into CDU for IV abx and monitoring. Pt noted to be slightly tachypneic on rounds, reports still feeling sick, but improved from yesterday.      GEN - NAD; well appearing; A+O x3; non-toxic appearing  CARD -s1s2, RRR, no M,G,R;   PULM - CTA b/l, symmetric breath sounds;   ABD -  +BS, ND, NT, soft, no guarding, no rebound, no masses;   BACK - no CVA tenderness, Normal  spine;   EXT - symmetric pulses, 2+ dp, capillary refill < 2 seconds, no cyanosis, no edema;   NEURO - no focal neuro deficits, no slurred speech    D/c fluids, continue antibiotics.  Admit to medicine for UTI and sepsis.

## 2022-02-22 NOTE — H&P ADULT - NSHPREVIEWOFSYSTEMS_GEN_ALL_CORE
CONSTITUTIONAL: No weakness. + fevers and chills  EYES/ENT: No visual changes;  No vertigo or throat pain   NECK: No pain or stiffness  RESPIRATORY: No cough, wheezing, hemoptysis; No shortness of breath  CARDIOVASCULAR: No chest pain. + palpitations  GASTROINTESTINAL: +Suprapubic pain. No nausea, vomiting, or hematemesis; No diarrhea or constipation. No melena or hematochezia.  GENITOURINARY: +dysuria. No frequency or hematuria  NEUROLOGICAL: No numbness or weakness  ALLERGY: No hives.   HEME: No bleeding.  SKIN: No itching, rashes.

## 2022-02-22 NOTE — H&P ADULT - NSHPLABSRESULTS_GEN_ALL_CORE
13.6   18.93 )-----------( 153      ( 2022 07:11 )             39.1     Auto Eosinophil # 0.04  / Auto Eosinophil % 0.2   / Auto Neutrophil # 15.33 / Auto Neutrophil % 81.0  / BANDS % x                            15.7   18.23 )-----------( 183      ( 2022 15:07 )             45.4     Auto Eosinophil # 0.00  / Auto Eosinophil % 0.0   / Auto Neutrophil # 16.84 / Auto Neutrophil % 83.1  / BANDS % 9.3      02    138  |  112<H>  |  9   ----------------------------<  101<H>  3.7   |  18<L>  |  0.82      137  |  101  |  10  ----------------------------<  170<H>  3.7   |  19<L>  |  1.02    Ca    7.6<L>      2022 07:11  TPro  6.0  /  Alb  2.9<L>  /  TBili  0.4  /  DBili  x   /  AST  13  /  ALT  10  /  AlkPhos  58  02  TPro  7.2  /  Alb  3.9  /  TBili  0.4  /  DBili  x   /  AST  18  /  ALT  18  /  AlkPhos  96  02-21    PT/INR - ( 2022 15:07 )   PT: 13.3 sec;   INR: 1.18 ratio         PTT - ( 2022 15:07 )  PTT:27.4 sec  CARDIAC MARKERS ( 2022 15:11 )  x     / x     / 95 U/L / x     / x          Urinalysis Basic - ( 2022 15:07 )    Color: Yellow / Appearance: Clear / S.022 / pH: x  Gluc: x / Ketone: Negative  / Bili: Negative / Urobili: <2 mg/dL   Blood: x / Protein: 100 mg/dL / Nitrite: Negative   Leuk Esterase: Large / RBC: 1 /HPF / WBC 40 /HPF   Sq Epi: x / Non Sq Epi: 0 /HPF / Bacteria: Many      EXG: Sinus tachycardia to HR 130s with no ST/T changes. QTc 429.       CXR:   ACC: [id number] EXAM:  XR CHEST PORTABLE URGENT 1V                          PROCEDURE DATE:  2022      INTERPRETATION:  EXAMINATION: XR CHEST URGENT    CLINICAL INDICATION: Sepsis    TECHNIQUE: Single frontal, portable view of the chest was obtained.    COMPARISON: No prior relevant imaging for comparison.    FINDINGS:  Heart size and the mediastinum cannot be accurately evaluated on this   projection.  The lungs are clear.  There is no pneumothorax or pleural effusion.  No acute bony abnormality.    IMPRESSION:  Clear lungs.    --- End of Report --- 13.6   18.93 )-----------( 153      ( 2022 07:11 )             39.1                          15.7   18.23 )-----------( 183      ( 2022 15:07 )             45.4     Auto Eosinophil # 0.00  / Auto Eosinophil % 0.0   / Auto Neutrophil # 16.84 / Auto Neutrophil % 83.1  / BANDS % 9.3          138  |  112<H>  |  9   ----------------------------<  101<H>  3.7   |  18<L>  |  0.82      137  |  101  |  10  ----------------------------<  170<H>  3.7   |  19<L>  |  1.02    Ca    7.6<L>      2022 07:11  TPro  6.0  /  Alb  2.9<L>  /  TBili  0.4  /  DBili  x   /  AST  13  /  ALT  10  /  AlkPhos  58  02-22  TPro  7.2  /  Alb  3.9  /  TBili  0.4  /  DBili  x   /  AST  18  /  ALT  18  /  AlkPhos  96  02-21    PT/INR - ( 2022 15:07 )   PT: 13.3 sec;   INR: 1.18 ratio         PTT - ( 2022 15:07 )  PTT:27.4 sec  CARDIAC MARKERS ( 2022 15:11 )  x     / x     / 95 U/L / x     / x          Urinalysis Basic - ( 2022 15:07 )    Color: Yellow / Appearance: Clear / S.022 / pH: x  Gluc: x / Ketone: Negative  / Bili: Negative / Urobili: <2 mg/dL   Blood: x / Protein: 100 mg/dL / Nitrite: Negative   Leuk Esterase: Large / RBC: 1 /HPF / WBC 40 /HPF   Sq Epi: x / Non Sq Epi: 0 /HPF / Bacteria: Many      EKG personally reviewed: Sinus tachycardia to HR 130s with no ST/T changes. QTc 429      CXR personally reviewed: Clear lungs                      FINDINGS:  Heart size and the mediastinum cannot be accurately evaluated on this   projection.  The lungs are clear.  There is no pneumothorax or pleural effusion.  No acute bony abnormality.    IMPRESSION:  Clear lungs.

## 2022-02-23 DIAGNOSIS — R73.03 PREDIABETES: ICD-10-CM

## 2022-02-23 LAB
A1C WITH ESTIMATED AVERAGE GLUCOSE RESULT: 6 % — HIGH (ref 4–5.6)
ANION GAP SERPL CALC-SCNC: 12 MMOL/L — SIGNIFICANT CHANGE UP (ref 7–14)
BUN SERPL-MCNC: 7 MG/DL — SIGNIFICANT CHANGE UP (ref 7–23)
C DIFF BY PCR RESULT: SIGNIFICANT CHANGE UP
C DIFF TOX GENS STL QL NAA+PROBE: SIGNIFICANT CHANGE UP
C TRACH RRNA SPEC QL NAA+PROBE: SIGNIFICANT CHANGE UP
CALCIUM SERPL-MCNC: 8.4 MG/DL — SIGNIFICANT CHANGE UP (ref 8.4–10.5)
CHLORIDE SERPL-SCNC: 109 MMOL/L — HIGH (ref 98–107)
CHOLEST SERPL-MCNC: 166 MG/DL — SIGNIFICANT CHANGE UP
CO2 SERPL-SCNC: 17 MMOL/L — LOW (ref 22–31)
CREAT SERPL-MCNC: 0.78 MG/DL — SIGNIFICANT CHANGE UP (ref 0.5–1.3)
ESTIMATED AVERAGE GLUCOSE: 126 — SIGNIFICANT CHANGE UP
GLUCOSE SERPL-MCNC: 96 MG/DL — SIGNIFICANT CHANGE UP (ref 70–99)
HCT VFR BLD CALC: 39.6 % — SIGNIFICANT CHANGE UP (ref 39–50)
HDLC SERPL-MCNC: 24 MG/DL — LOW
HGB BLD-MCNC: 13.5 G/DL — SIGNIFICANT CHANGE UP (ref 13–17)
LIPID PNL WITH DIRECT LDL SERPL: 98 MG/DL — SIGNIFICANT CHANGE UP
MAGNESIUM SERPL-MCNC: 1.9 MG/DL — SIGNIFICANT CHANGE UP (ref 1.6–2.6)
MCHC RBC-ENTMCNC: 29.3 PG — SIGNIFICANT CHANGE UP (ref 27–34)
MCHC RBC-ENTMCNC: 34.1 GM/DL — SIGNIFICANT CHANGE UP (ref 32–36)
MCV RBC AUTO: 86.1 FL — SIGNIFICANT CHANGE UP (ref 80–100)
N GONORRHOEA RRNA SPEC QL NAA+PROBE: SIGNIFICANT CHANGE UP
NON HDL CHOLESTEROL: 142 MG/DL — HIGH
NRBC # BLD: 0 /100 WBCS — SIGNIFICANT CHANGE UP
NRBC # FLD: 0 K/UL — SIGNIFICANT CHANGE UP
PHOSPHATE SERPL-MCNC: 3.1 MG/DL — SIGNIFICANT CHANGE UP (ref 2.5–4.5)
PLATELET # BLD AUTO: 171 K/UL — SIGNIFICANT CHANGE UP (ref 150–400)
POTASSIUM SERPL-MCNC: 3.6 MMOL/L — SIGNIFICANT CHANGE UP (ref 3.5–5.3)
POTASSIUM SERPL-SCNC: 3.6 MMOL/L — SIGNIFICANT CHANGE UP (ref 3.5–5.3)
RBC # BLD: 4.6 M/UL — SIGNIFICANT CHANGE UP (ref 4.2–5.8)
RBC # FLD: 13.9 % — SIGNIFICANT CHANGE UP (ref 10.3–14.5)
SODIUM SERPL-SCNC: 138 MMOL/L — SIGNIFICANT CHANGE UP (ref 135–145)
SPECIMEN SOURCE: SIGNIFICANT CHANGE UP
TRIGL SERPL-MCNC: 220 MG/DL — HIGH
WBC # BLD: 14.23 K/UL — HIGH (ref 3.8–10.5)
WBC # FLD AUTO: 14.23 K/UL — HIGH (ref 3.8–10.5)

## 2022-02-23 PROCEDURE — 99233 SBSQ HOSP IP/OBS HIGH 50: CPT | Mod: GC

## 2022-02-23 RX ORDER — LACTOBACILLUS ACIDOPHILUS 100MM CELL
1 CAPSULE ORAL DAILY
Refills: 0 | Status: DISCONTINUED | OUTPATIENT
Start: 2022-02-23 | End: 2022-02-24

## 2022-02-23 RX ADMIN — Medication 1 TABLET(S): at 17:36

## 2022-02-23 RX ADMIN — Medication 1 PATCH: at 19:50

## 2022-02-23 RX ADMIN — ENOXAPARIN SODIUM 40 MILLIGRAM(S): 100 INJECTION SUBCUTANEOUS at 11:28

## 2022-02-23 RX ADMIN — Medication 1 TABLET(S): at 11:28

## 2022-02-23 RX ADMIN — Medication 1 APPLICATION(S): at 11:28

## 2022-02-23 RX ADMIN — Medication 100 MILLIGRAM(S): at 05:42

## 2022-02-23 RX ADMIN — Medication 650 MILLIGRAM(S): at 01:20

## 2022-02-23 RX ADMIN — Medication 1 PATCH: at 11:29

## 2022-02-23 RX ADMIN — Medication 650 MILLIGRAM(S): at 00:32

## 2022-02-23 NOTE — PROGRESS NOTE ADULT - PROBLEM SELECTOR PLAN 2
- Differential includes phimosis vs. balantitis  - Continue betamethasone topical to affected area daily for 4 weeks - Differential includes phimosis vs. balanitis  - Continue betamethasone topical to affected area daily for 4 weeks

## 2022-02-23 NOTE — PROGRESS NOTE ADULT - PROBLEM SELECTOR PLAN 1
- E. coli would be the most likely cause of UTI in men over 35, however cannot exclude STI at this time especially in a immunocompetent male with no known medical conditions. Will cover for empirically cover for gonorrhea/chlamydia.  - Patient febrile, tachycardic, neutrophil-predominant leukocytosis to 18 with bandemia, lactate 3.7 on admission   - UA showing +leuk esterase and many bacteria   - Ucx: >100k GBS. Bcx NGTD  - HIV negative   - Continue Ceftriaxone 1g qdaily for 7-day course (2/21-2/27)   - Continue Doxycycline 100mg BID for 7-day course (2/22-2/28) for empiric chlamydia treatment  - F/u gonorrhea/chlamydia - E. coli would be the most likely cause of UTI in men over 35, however cannot exclude STI at this time especially in a immunocompetent male with no known medical conditions. Will cover for empirically cover for gonorrhea/chlamydia.  - Patient febrile, tachycardic, neutrophil-predominant leukocytosis to 18 with bandemia, lactate 3.7 on admission   - UA showing +leuk esterase and many bacteria   - Ucx: >100k GBS. Bcx NGTD  - HIV negative   - Continue Ceftriaxone 1g qdaily for 7-day course (2/21-2/27). Narrow to Augmentin on discharge    - Continue Doxycycline 100mg BID for 7-day course (2/22-2/28) for empiric chlamydia treatment  - F/u gonorrhea/chlamydia

## 2022-02-23 NOTE — PROGRESS NOTE ADULT - ASSESSMENT
42yo M active 10-pack year smoker with no known PMH presents with suprapubic pain and dysuria. Admitted for sepsis 2/2 UTI and penile swelling.

## 2022-02-23 NOTE — PATIENT PROFILE ADULT - FALL HARM RISK - UNIVERSAL INTERVENTIONS
Bed in lowest position, wheels locked, appropriate side rails in place/Call bell, personal items and telephone in reach/Instruct patient to call for assistance before getting out of bed or chair/Non-slip footwear when patient is out of bed/[place] to call system/Physically safe environment - no spills, clutter or unnecessary equipment/Purposeful Proactive Rounding/Room/bathroom lighting operational, light cord in reach

## 2022-02-23 NOTE — PROGRESS NOTE ADULT - SUBJECTIVE AND OBJECTIVE BOX
%%%%INCOMPLETE NOTE%%%%%     Dr. Taylor Alfaro, PGY-1    Patient is a 41y old  Male who presents with a chief complaint of urosepsis (2022 16:18)    Subjective: Urine culture growing >100k GBS. No acute events overnight. Patient seen and examined at bedside. BMx1 yesterday. Denies chest pain, abdominal pain, cough, n/v, sob. Breathing comfortably on room air.    MEDICATIONS  (STANDING):  betamethasone valerate 0.1% Cream 1 Application(s) Topical daily  cefTRIAXone   IVPB 1000 milliGRAM(s) IV Intermittent every 24 hours  doxycycline hyclate Capsule 100 milliGRAM(s) Oral every 12 hours  enoxaparin Injectable 40 milliGRAM(s) SubCutaneous daily  nicotine -   7 mG/24Hr(s) Patch 1 patch Transdermal daily    MEDICATIONS  (PRN):  acetaminophen     Tablet .. 650 milliGRAM(s) Oral every 6 hours PRN Temp greater or equal to 38C (100.4F), Mild Pain (1 - 3), Moderate Pain (4 - 6)        Objective:     Vitals: Vital Signs Last 24 Hrs  T(C): 37.2 (22 @ 05:50), Max: 38.7 (22 @ 00:36)  T(F): 98.9 (22 @ 05:50), Max: 101.6 (22 @ 00:36)  HR: 85 (22 @ 05:50) (85 - 100)  BP: 123/83 (22 @ 05:50) (118/80 - 139/93)  BP(mean): --  RR: 18 (22 @ 05:50) (16 - 22)  SpO2: 98% (22 @ 05:50) (95% - 99%)            I&O's Summary    2022 07:01  -  2022 07:00  --------------------------------------------------------  IN: 75 mL / OUT: 500 mL / NET: -425 mL        PHYSICAL EXAM:  GENERAL: NAD, well-groomed, well-developed  HEAD:  Atraumatic, Normocephalic  EYES: EOMI, PERRLA, conjunctiva and sclera clear  ENMT: Moist mucous membranes  NECK: Supple, No JVD  CHEST/LUNG: Clear to auscultation bilaterally; No rales, rhonchi, wheezing, or rubs  HEART: Regular rate and rhythm; No murmurs, rubs, or gallops  ABDOMEN: Soft, Nontender, Nondistended; Bowel sounds present  EXTREMITIES:  2+ Peripheral Pulses, No clubbing, cyanosis, or edema  : Penile swelling, especially inferior portion of crown. Non-erythematous, non-tender, no discharge expressed. No scrotal swelling or tenderness.*   SKIN: No rashes or lesions  NERVOUS SYSTEM:  Alert & Oriented X4, Good concentration  PSYCH: Normal Affect. Speaking in Full Sentences. Laying in bed comfortably; not agitated  * exam performed with male provider at bedside    LABS:                        13.5   14.23 )-----------( 171      ( 2022 06:23 )             39.6                         13.6   18.93 )-----------( 153      ( 2022 07:11 )             39.1                         15.7   18.23 )-----------( 183      ( 2022 15:07 )             45.4     Hgb Trend: 13.5<--, 13.6<--, 15.7<--  02-    138  |  112<H>  |  9   ----------------------------<  101<H>  3.7   |  18<L>  |  0.82      137  |  101  |  10  ----------------------------<  170<H>  3.7   |  19<L>  |  1.02    Ca    7.6<L>      2022 07:11  Ca    8.8      2022 15:11    TPro  6.0  /  Alb  2.9<L>  /  TBili  0.4  /  DBili  x   /  AST  13  /  ALT  10  /  AlkPhos  58  02-22  TPro  7.2  /  Alb  3.9  /  TBili  0.4  /  DBili  x   /  AST  18  /  ALT  18  /  AlkPhos  96  02-21    Creatinine Trend: 0.82<--, 1.02<--  PT/INR - ( 2022 15:07 )   PT: 13.3 sec;   INR: 1.18 ratio         PTT - ( 2022 15:07 )  PTT:27.4 sec              Urinalysis Basic - ( 2022 15:07 )    Color: Yellow / Appearance: Clear / S.022 / pH: x  Gluc: x / Ketone: Negative  / Bili: Negative / Urobili: <2 mg/dL   Blood: x / Protein: 100 mg/dL / Nitrite: Negative   Leuk Esterase: Large / RBC: 1 /HPF / WBC 40 /HPF   Sq Epi: x / Non Sq Epi: 0 /HPF / Bacteria: Many        CAPILLARY BLOOD GLUCOSE         %%%%INCOMPLETE NOTE%%%%%     Dr. Taylor Alfaro, PGY-1    Patient is a 41y old  Male who presents with a chief complaint of urosepsis (2022 16:18)    Subjective: Urine culture growing >100k GBS. Febrile to 101.6F overnight, given Tylenol. Patient seen and examined at bedside. BMx1 yesterday. Denies chest pain, abdominal pain, cough, n/v, sob. Breathing comfortably on room air.    MEDICATIONS  (STANDING):  betamethasone valerate 0.1% Cream 1 Application(s) Topical daily  cefTRIAXone   IVPB 1000 milliGRAM(s) IV Intermittent every 24 hours  doxycycline hyclate Capsule 100 milliGRAM(s) Oral every 12 hours  enoxaparin Injectable 40 milliGRAM(s) SubCutaneous daily  nicotine -   7 mG/24Hr(s) Patch 1 patch Transdermal daily    MEDICATIONS  (PRN):  acetaminophen     Tablet .. 650 milliGRAM(s) Oral every 6 hours PRN Temp greater or equal to 38C (100.4F), Mild Pain (1 - 3), Moderate Pain (4 - 6)        Objective:     Vitals: Vital Signs Last 24 Hrs  T(C): 37.2 (22 @ 05:50), Max: 38.7 (22 @ 00:36)  T(F): 98.9 (22 @ 05:50), Max: 101.6 (22 @ 00:36)  HR: 85 (22 @ 05:50) (85 - 100)  BP: 123/83 (22 @ 05:50) (118/80 - 139/93)  BP(mean): --  RR: 18 (22 @ 05:50) (16 - 22)  SpO2: 98% (22 @ 05:50) (95% - 99%)            I&O's Summary    2022 07:01  -  2022 07:00  --------------------------------------------------------  IN: 75 mL / OUT: 500 mL / NET: -425 mL        PHYSICAL EXAM:  GENERAL: NAD, well-groomed, well-developed  HEAD:  Atraumatic, Normocephalic  EYES: EOMI, PERRLA, conjunctiva and sclera clear  ENMT: Moist mucous membranes  NECK: Supple, No JVD  CHEST/LUNG: Clear to auscultation bilaterally; No rales, rhonchi, wheezing, or rubs  HEART: Regular rate and rhythm; No murmurs, rubs, or gallops  ABDOMEN: Soft, Nontender, Nondistended; Bowel sounds present  EXTREMITIES:  2+ Peripheral Pulses, No clubbing, cyanosis, or edema  : Penile swelling, especially inferior portion of crown. Non-erythematous, non-tender, no discharge expressed. No scrotal swelling or tenderness.*   SKIN: No rashes or lesions  NERVOUS SYSTEM:  Alert & Oriented X4, Good concentration  PSYCH: Normal Affect. Speaking in Full Sentences. Laying in bed comfortably; not agitated  * exam performed with male provider at bedside    LABS:                        13.5   14.23 )-----------( 171      ( 2022 06:23 )             39.6                         13.6   18.93 )-----------( 153      ( 2022 07:11 )             39.1                         15.7   18.23 )-----------( 183      ( 2022 15:07 )             45.4     Hgb Trend: 13.5<--, 13.6<--, 15.7<--  02-    138  |  112<H>  |  9   ----------------------------<  101<H>  3.7   |  18<L>  |  0.82      137  |  101  |  10  ----------------------------<  170<H>  3.7   |  19<L>  |  1.02    Ca    7.6<L>      2022 07:11  Ca    8.8      2022 15:11    TPro  6.0  /  Alb  2.9<L>  /  TBili  0.4  /  DBili  x   /  AST  13  /  ALT  10  /  AlkPhos  58  02-22  TPro  7.2  /  Alb  3.9  /  TBili  0.4  /  DBili  x   /  AST  18  /  ALT  18  /  AlkPhos  96  -21    Creatinine Trend: 0.82<--, 1.02<--  PT/INR - ( 2022 15:07 )   PT: 13.3 sec;   INR: 1.18 ratio         PTT - ( 2022 15:07 )  PTT:27.4 sec              Urinalysis Basic - ( 2022 15:07 )    Color: Yellow / Appearance: Clear / S.022 / pH: x  Gluc: x / Ketone: Negative  / Bili: Negative / Urobili: <2 mg/dL   Blood: x / Protein: 100 mg/dL / Nitrite: Negative   Leuk Esterase: Large / RBC: 1 /HPF / WBC 40 /HPF   Sq Epi: x / Non Sq Epi: 0 /HPF / Bacteria: Many        CAPILLARY BLOOD GLUCOSE         Dr. Taylor Alfaro, PGY-1    Patient is a 41y old  Male who presents with a chief complaint of urosepsis (2022 16:18)    Subjective: Urine culture growing >100k GBS. Febrile to 101.6F overnight with chills, given Tylenol. Patient seen and examined at bedside. 4-5 watery bowel movements yesterday. Penile swelling is "better than yesterday." C. diff sample sent. Denies chest pain, abdominal pain, cough, n/v, sob. Breathing comfortably on room air.     MEDICATIONS  (STANDING):  betamethasone valerate 0.1% Cream 1 Application(s) Topical daily  cefTRIAXone   IVPB 1000 milliGRAM(s) IV Intermittent every 24 hours  doxycycline hyclate Capsule 100 milliGRAM(s) Oral every 12 hours  enoxaparin Injectable 40 milliGRAM(s) SubCutaneous daily  nicotine -   7 mG/24Hr(s) Patch 1 patch Transdermal daily    MEDICATIONS  (PRN):  acetaminophen     Tablet .. 650 milliGRAM(s) Oral every 6 hours PRN Temp greater or equal to 38C (100.4F), Mild Pain (1 - 3), Moderate Pain (4 - 6)        Objective:     Vitals: Vital Signs Last 24 Hrs  T(C): 37.2 (22 @ 05:50), Max: 38.7 (22 @ 00:36)  T(F): 98.9 (22 @ 05:50), Max: 101.6 (22 @ 00:36)  HR: 85 (22 @ 05:50) (85 - 100)  BP: 123/83 (22 @ 05:50) (118/80 - 139/93)  BP(mean): --  RR: 18 (22 @ 05:50) (16 - 22)  SpO2: 98% (22 @ 05:50) (95% - 99%)            I&O's Summary    2022 07:01  -  2022 07:00  --------------------------------------------------------  IN: 75 mL / OUT: 500 mL / NET: -425 mL        PHYSICAL EXAM:  GENERAL: NAD, well-groomed, well-developed  HEAD:  Atraumatic, Normocephalic  EYES: EOMI, PERRLA, conjunctiva and sclera clear  ENMT: Moist mucous membranes  NECK: Supple, No JVD  CHEST/LUNG: Clear to auscultation bilaterally; No rales, rhonchi, wheezing, or rubs  HEART: Regular rate and rhythm; No murmurs, rubs, or gallops  ABDOMEN: Soft, Nontender, Nondistended; Bowel sounds present  EXTREMITIES:  2+ Peripheral Pulses, No clubbing, cyanosis, or edema  : Penile swelling, especially inferior portion of crown. Non-erythematous, non-tender, no discharge expressed. No scrotal swelling or tenderness.*   SKIN: No rashes or lesions  NERVOUS SYSTEM:  Alert & Oriented X4, Good concentration  PSYCH: Normal Affect. Speaking in Full Sentences. Laying in bed comfortably; not agitated  * exam performed with male provider at bedside    LABS:                        13.5   14.23 )-----------( 171      ( 2022 06:23 )             39.6                         13.6   18.93 )-----------( 153      ( 2022 07:11 )             39.1                         15.7   18.23 )-----------( 183      ( 2022 15:07 )             45.4     Hgb Trend: 13.5<--, 13.6<--, 15.7<--      138  |  112<H>  |  9   ----------------------------<  101<H>  3.7   |  18<L>  |  0.82      137  |  101  |  10  ----------------------------<  170<H>  3.7   |  19<L>  |  1.02    Ca    7.6<L>      2022 07:11  Ca    8.8      2022 15:11    TPro  6.0  /  Alb  2.9<L>  /  TBili  0.4  /  DBili  x   /  AST  13  /  ALT  10  /  AlkPhos  58  -22  TPro  7.2  /  Alb  3.9  /  TBili  0.4  /  DBili  x   /  AST  18  /  ALT  18  /  AlkPhos  96  -21    Creatinine Trend: 0.82<--, 1.02<--  PT/INR - ( 2022 15:07 )   PT: 13.3 sec;   INR: 1.18 ratio         PTT - ( 2022 15:07 )  PTT:27.4 sec              Urinalysis Basic - ( 2022 15:07 )    Color: Yellow / Appearance: Clear / S.022 / pH: x  Gluc: x / Ketone: Negative  / Bili: Negative / Urobili: <2 mg/dL   Blood: x / Protein: 100 mg/dL / Nitrite: Negative   Leuk Esterase: Large / RBC: 1 /HPF / WBC 40 /HPF   Sq Epi: x / Non Sq Epi: 0 /HPF / Bacteria: Many        CAPILLARY BLOOD GLUCOSE         Dr. Taylor Alfaro, PGY-1    Patient is a 41y old  Male who presents with a chief complaint of urosepsis (2022 16:18)    Subjective: Urine culture growing >100k GBS. Febrile to 101.6F overnight with chills despite receiving 2 doses of ceftriaxone, given Tylenol. Patient seen and examined at bedside. 4 watery bowel movements yesterday afternoon and then an additional formed BM at night. No recent antibiotic use or hospitalization. C. diff sample sent. Penile swelling is "better than yesterday." Denies chest pain, abdominal pain, cough, n/v, sob, dysuria, hematuria. Breathing comfortably on room air.     MEDICATIONS  (STANDING):  betamethasone valerate 0.1% Cream 1 Application(s) Topical daily  cefTRIAXone   IVPB 1000 milliGRAM(s) IV Intermittent every 24 hours  doxycycline hyclate Capsule 100 milliGRAM(s) Oral every 12 hours  enoxaparin Injectable 40 milliGRAM(s) SubCutaneous daily  nicotine -   7 mG/24Hr(s) Patch 1 patch Transdermal daily    MEDICATIONS  (PRN):  acetaminophen     Tablet .. 650 milliGRAM(s) Oral every 6 hours PRN Temp greater or equal to 38C (100.4F), Mild Pain (1 - 3), Moderate Pain (4 - 6)        Objective:     Vitals: Vital Signs Last 24 Hrs  T(C): 37.2 (22 @ 05:50), Max: 38.7 (22 @ 00:36)  T(F): 98.9 (22 @ 05:50), Max: 101.6 (22 @ 00:36)  HR: 85 (22 @ 05:50) (85 - 100)  BP: 123/83 (22 @ 05:50) (118/80 - 139/93)  BP(mean): --  RR: 18 (22 @ 05:50) (16 - 22)  SpO2: 98% (22 @ 05:50) (95% - 99%)            I&O's Summary    2022 07:01  -  2022 07:00  --------------------------------------------------------  IN: 75 mL / OUT: 500 mL / NET: -425 mL        PHYSICAL EXAM:  GENERAL: NAD, well-groomed, well-developed  HEAD:  Atraumatic, Normocephalic  EYES: EOMI, PERRLA, conjunctiva and sclera clear  ENMT: Moist mucous membranes  NECK: Supple, No JVD  CHEST/LUNG: Clear to auscultation bilaterally; No rales, rhonchi, wheezing, or rubs  HEART: Regular rate and rhythm; No murmurs, rubs, or gallops  ABDOMEN: Soft, Nontender, Nondistended; Bowel sounds present  EXTREMITIES:  2+ Peripheral Pulses, No clubbing, cyanosis, or edema  BACK: No CVA tenderness  : Penile swelling, especially inferior portion of crown. Non-erythematous, non-tender, no discharge expressed. No lesions. No scrotal swelling or tenderness.*   SKIN: No rashes or lesions  NERVOUS SYSTEM:  Alert & Oriented X4, Good concentration  PSYCH: Normal Affect. Speaking in Full Sentences. Laying in bed comfortably; not agitated  * exam performed with male provider at bedside    LABS:                        13.5   14.23 )-----------( 171      ( 2022 06:23 )             39.6                         13.6   18.93 )-----------( 153      ( 2022 07:11 )             39.1                         15.7   18.23 )-----------( 183      ( 2022 15:07 )             45.4     Hgb Trend: 13.5<--, 13.6<--, 15.7<--      138  |  112<H>  |  9   ----------------------------<  101<H>  3.7   |  18<L>  |  0.82      137  |  101  |  10  ----------------------------<  170<H>  3.7   |  19<L>  |  1.02    Ca    7.6<L>      2022 07:11  Ca    8.8      2022 15:11    TPro  6.0  /  Alb  2.9<L>  /  TBili  0.4  /  DBili  x   /  AST  13  /  ALT  10  /  AlkPhos  58  -  TPro  7.2  /  Alb  3.9  /  TBili  0.4  /  DBili  x   /  AST  18  /  ALT  18  /  AlkPhos  96  02-21    Creatinine Trend: 0.82<--, 1.02<--  PT/INR - ( 2022 15:07 )   PT: 13.3 sec;   INR: 1.18 ratio         PTT - ( 2022 15:07 )  PTT:27.4 sec              Urinalysis Basic - ( 2022 15:07 )    Color: Yellow / Appearance: Clear / S.022 / pH: x  Gluc: x / Ketone: Negative  / Bili: Negative / Urobili: <2 mg/dL   Blood: x / Protein: 100 mg/dL / Nitrite: Negative   Leuk Esterase: Large / RBC: 1 /HPF / WBC 40 /HPF   Sq Epi: x / Non Sq Epi: 0 /HPF / Bacteria: Many        CAPILLARY BLOOD GLUCOSE

## 2022-02-23 NOTE — PROGRESS NOTE ADULT - PROBLEM SELECTOR PLAN 4
DVT ppx: Lovenox   Diet: Regular   Dispo: Home   FULL CODE   Will need PCP referral upon discharge - HbA1c = 6  - Will  patient on lifestyle management

## 2022-02-24 ENCOUNTER — TRANSCRIPTION ENCOUNTER (OUTPATIENT)
Age: 42
End: 2022-02-24

## 2022-02-24 VITALS
OXYGEN SATURATION: 99 % | RESPIRATION RATE: 18 BRPM | TEMPERATURE: 98 F | HEART RATE: 88 BPM | SYSTOLIC BLOOD PRESSURE: 128 MMHG | DIASTOLIC BLOOD PRESSURE: 82 MMHG

## 2022-02-24 LAB
ANION GAP SERPL CALC-SCNC: 13 MMOL/L — SIGNIFICANT CHANGE UP (ref 7–14)
BUN SERPL-MCNC: 10 MG/DL — SIGNIFICANT CHANGE UP (ref 7–23)
CALCIUM SERPL-MCNC: 9.2 MG/DL — SIGNIFICANT CHANGE UP (ref 8.4–10.5)
CHLORIDE SERPL-SCNC: 106 MMOL/L — SIGNIFICANT CHANGE UP (ref 98–107)
CO2 SERPL-SCNC: 18 MMOL/L — LOW (ref 22–31)
CREAT SERPL-MCNC: 0.84 MG/DL — SIGNIFICANT CHANGE UP (ref 0.5–1.3)
GLUCOSE SERPL-MCNC: 93 MG/DL — SIGNIFICANT CHANGE UP (ref 70–99)
HCT VFR BLD CALC: 41.9 % — SIGNIFICANT CHANGE UP (ref 39–50)
HGB BLD-MCNC: 14.1 G/DL — SIGNIFICANT CHANGE UP (ref 13–17)
MAGNESIUM SERPL-MCNC: 1.9 MG/DL — SIGNIFICANT CHANGE UP (ref 1.6–2.6)
MCHC RBC-ENTMCNC: 29.1 PG — SIGNIFICANT CHANGE UP (ref 27–34)
MCHC RBC-ENTMCNC: 33.7 GM/DL — SIGNIFICANT CHANGE UP (ref 32–36)
MCV RBC AUTO: 86.6 FL — SIGNIFICANT CHANGE UP (ref 80–100)
NRBC # BLD: 0 /100 WBCS — SIGNIFICANT CHANGE UP
NRBC # FLD: 0 K/UL — SIGNIFICANT CHANGE UP
PHOSPHATE SERPL-MCNC: 4.2 MG/DL — SIGNIFICANT CHANGE UP (ref 2.5–4.5)
PLATELET # BLD AUTO: 202 K/UL — SIGNIFICANT CHANGE UP (ref 150–400)
POTASSIUM SERPL-MCNC: 3.7 MMOL/L — SIGNIFICANT CHANGE UP (ref 3.5–5.3)
POTASSIUM SERPL-SCNC: 3.7 MMOL/L — SIGNIFICANT CHANGE UP (ref 3.5–5.3)
RBC # BLD: 4.84 M/UL — SIGNIFICANT CHANGE UP (ref 4.2–5.8)
RBC # FLD: 13.8 % — SIGNIFICANT CHANGE UP (ref 10.3–14.5)
SODIUM SERPL-SCNC: 137 MMOL/L — SIGNIFICANT CHANGE UP (ref 135–145)
WBC # BLD: 10.49 K/UL — SIGNIFICANT CHANGE UP (ref 3.8–10.5)
WBC # FLD AUTO: 10.49 K/UL — SIGNIFICANT CHANGE UP (ref 3.8–10.5)

## 2022-02-24 PROCEDURE — 99238 HOSP IP/OBS DSCHRG MGMT 30/<: CPT | Mod: GC

## 2022-02-24 RX ADMIN — Medication 1 TABLET(S): at 06:06

## 2022-02-24 RX ADMIN — Medication 1 PATCH: at 11:17

## 2022-02-24 RX ADMIN — Medication 1 PATCH: at 08:53

## 2022-02-24 RX ADMIN — Medication 1 APPLICATION(S): at 11:18

## 2022-02-24 RX ADMIN — ENOXAPARIN SODIUM 40 MILLIGRAM(S): 100 INJECTION SUBCUTANEOUS at 11:17

## 2022-02-24 NOTE — PROGRESS NOTE ADULT - SUBJECTIVE AND OBJECTIVE BOX
%%%%INCOMPLETE NOTE%%%%%     Dr. Gallo Escobar PGY2    MIR JUNIOR  41y  MRN: [id number]    Subjective:    Patient is a 41y old  Male who presents with a chief complaint of urosepsis (23 Feb 2022 06:51)      MEDICATIONS  (STANDING):  amoxicillin  875 milliGRAM(s)/clavulanate 1 Tablet(s) Oral two times a day  betamethasone valerate 0.1% Cream 1 Application(s) Topical daily  enoxaparin Injectable 40 milliGRAM(s) SubCutaneous daily  lactobacillus acidophilus 1 Tablet(s) Oral daily  nicotine -   7 mG/24Hr(s) Patch 1 patch Transdermal daily    MEDICATIONS  (PRN):  acetaminophen     Tablet .. 650 milliGRAM(s) Oral every 6 hours PRN Temp greater or equal to 38C (100.4F), Mild Pain (1 - 3), Moderate Pain (4 - 6)        Objective:    Vitals: Vital Signs Last 24 Hrs  T(C): 36.8 (02-23-22 @ 19:50), Max: 37 (02-23-22 @ 13:13)  T(F): 98.2 (02-23-22 @ 19:50), Max: 98.6 (02-23-22 @ 13:13)  HR: 97 (02-23-22 @ 19:50) (92 - 97)  BP: 139/83 (02-23-22 @ 19:50) (139/83 - 143/86)  BP(mean): --  RR: 18 (02-23-22 @ 19:50) (18 - 18)  SpO2: 100% (02-23-22 @ 19:50) (98% - 100%)            I&O's Summary      PHYSICAL EXAM:  GENERAL: NAD, well-groomed, well-developed  HEAD:  Atraumatic, Normocephalic  EYES: EOMI, PERRLA, conjunctiva and sclera clear  ENMT: No tonsillar erythema, exudates, or enlargement; Moist mucous membranes, Good dentition, No lesions  NECK: Supple, No JVD, Normal thyroid  CHEST/LUNG: Clear to auscultation bilaterally; No rales, rhonchi, wheezing, or rubs  HEART: Regular rate and rhythm; No murmurs, rubs, or gallops  ABDOMEN: Soft, Nontender, Nondistended; Bowel sounds present  EXTREMITIES:  2+ Peripheral Pulses, No clubbing, cyanosis, or edema  LYMPH: No lymphadenopathy noted  SKIN: No rashes or lesions  NERVOUS SYSTEM:  Alert & Oriented X4, Good concentration  PSYCH: Normal Affect. Speaking in Full Sentences. Laying in bed comfortably; not agitated     LABS:                        13.5   14.23 )-----------( 171      ( 23 Feb 2022 06:23 )             39.6                         13.6   18.93 )-----------( 153      ( 22 Feb 2022 07:11 )             39.1                         15.7   18.23 )-----------( 183      ( 21 Feb 2022 15:07 )             45.4     Hgb Trend: 13.5<--, 13.6<--, 15.7<--  02-23    138  |  109<H>  |  7   ----------------------------<  96  3.6   |  17<L>  |  0.78  02-22    138  |  112<H>  |  9   ----------------------------<  101<H>  3.7   |  18<L>  |  0.82  02-21    137  |  101  |  10  ----------------------------<  170<H>  3.7   |  19<L>  |  1.02    Ca    8.4      23 Feb 2022 06:23  Ca    7.6<L>      22 Feb 2022 07:11  Ca    8.8      21 Feb 2022 15:11  Phos  3.1     02-23  Mg     1.90     02-23    TPro  6.0  /  Alb  2.9<L>  /  TBili  0.4  /  DBili  x   /  AST  13  /  ALT  10  /  AlkPhos  58  02-22  TPro  7.2  /  Alb  3.9  /  TBili  0.4  /  DBili  x   /  AST  18  /  ALT  18  /  AlkPhos  96  02-21    Creatinine Trend: 0.78<--, 0.82<--, 1.02<--                    CAPILLARY BLOOD GLUCOSE         Dr. Gallo Escobar PGY2    MIR JUNIOR  41y  MRN: [id number]    Subjective:    Patient is a 41y old  Male who presents with a chief complaint of urosepsis (23 Feb 2022 06:51)    Spoke with Patient at Bedside. No acute events overnight. No active complaints. Patient denies Ha/F/N/V/CP/SOB/Abd Pain/D/Swelling. Slight dysuria but overall improved.     MEDICATIONS  (STANDING):  amoxicillin  875 milliGRAM(s)/clavulanate 1 Tablet(s) Oral two times a day  betamethasone valerate 0.1% Cream 1 Application(s) Topical daily  enoxaparin Injectable 40 milliGRAM(s) SubCutaneous daily  lactobacillus acidophilus 1 Tablet(s) Oral daily  nicotine -   7 mG/24Hr(s) Patch 1 patch Transdermal daily    MEDICATIONS  (PRN):  acetaminophen     Tablet .. 650 milliGRAM(s) Oral every 6 hours PRN Temp greater or equal to 38C (100.4F), Mild Pain (1 - 3), Moderate Pain (4 - 6)        Objective:    Vitals: Vital Signs Last 24 Hrs  T(C): 36.8 (02-23-22 @ 19:50), Max: 37 (02-23-22 @ 13:13)  T(F): 98.2 (02-23-22 @ 19:50), Max: 98.6 (02-23-22 @ 13:13)  HR: 97 (02-23-22 @ 19:50) (92 - 97)  BP: 139/83 (02-23-22 @ 19:50) (139/83 - 143/86)  BP(mean): --  RR: 18 (02-23-22 @ 19:50) (18 - 18)  SpO2: 100% (02-23-22 @ 19:50) (98% - 100%)            I&O's Summary      PHYSICAL EXAM:  GENERAL: NAD, well-groomed, well-developed  HEAD:  Atraumatic, Normocephalic  EYES: EOMI, PERRLA, conjunctiva and sclera clear  ENMT: Moist mucous membranes  NECK: Supple, No JVD  CHEST/LUNG: Clear to auscultation bilaterally; No rales, rhonchi, wheezing, or rubs  HEART: Regular rate and rhythm; No murmurs, rubs, or gallops  ABDOMEN: Soft, Nontender, Nondistended; Bowel sounds present  EXTREMITIES:  2+ Peripheral Pulses, No clubbing, cyanosis, or edema  BACK: No CVA tenderness  : Penile swelling, especially inferior portion of crown improved Non-erythematous, non-tender, no discharge expressed. No lesions. mild scrotal swelling or tenderness.   SKIN: No rashes or lesions  NERVOUS SYSTEM:  Alert & Oriented X4, Good concentration  PSYCH: Normal Affect. Speaking in Full Sentences. Laying in bed comfortably; not agitated    LABS:                        13.5   14.23 )-----------( 171      ( 23 Feb 2022 06:23 )             39.6                         13.6   18.93 )-----------( 153      ( 22 Feb 2022 07:11 )             39.1                         15.7   18.23 )-----------( 183      ( 21 Feb 2022 15:07 )             45.4     Hgb Trend: 13.5<--, 13.6<--, 15.7<--  02-23    138  |  109<H>  |  7   ----------------------------<  96  3.6   |  17<L>  |  0.78  02-22    138  |  112<H>  |  9   ----------------------------<  101<H>  3.7   |  18<L>  |  0.82  02-21    137  |  101  |  10  ----------------------------<  170<H>  3.7   |  19<L>  |  1.02    Ca    8.4      23 Feb 2022 06:23  Ca    7.6<L>      22 Feb 2022 07:11  Ca    8.8      21 Feb 2022 15:11  Phos  3.1     02-23  Mg     1.90     02-23    TPro  6.0  /  Alb  2.9<L>  /  TBili  0.4  /  DBili  x   /  AST  13  /  ALT  10  /  AlkPhos  58  02-22  TPro  7.2  /  Alb  3.9  /  TBili  0.4  /  DBili  x   /  AST  18  /  ALT  18  /  AlkPhos  96  02-21    Creatinine Trend: 0.78<--, 0.82<--, 1.02<--                    CAPILLARY BLOOD GLUCOSE

## 2022-02-24 NOTE — PROGRESS NOTE ADULT - PROBLEM SELECTOR PLAN 3
- Active 1/2 pack per day smoking for 20 years   - Patient counseled on smoking cessation   - Continue nicotine patch
- Active 1/2 pack per day smoking for 20 years   - Patient counseled on smoking cessation   - Continue nicotine patch

## 2022-02-24 NOTE — DISCHARGE NOTE NURSING/CASE MANAGEMENT/SOCIAL WORK - PATIENT PORTAL LINK FT
You can access the FollowMyHealth Patient Portal offered by [facility] by registering at the following website: [email or web address]. By joining [employer]’s FollowMyHealth portal, you will also be able to view your health information using other applications (apps) compatible with our system.

## 2022-02-24 NOTE — PROGRESS NOTE ADULT - PROBLEM SELECTOR PLAN 5
DVT ppx: Lovenox   Diet: Regular   Dispo: Home   FULL CODE   Will need PCP referral upon discharge
DVT ppx: Lovenox   Diet: Regular   Dispo: Home   FULL CODE   Will need PCP referral upon discharge

## 2022-02-24 NOTE — PROGRESS NOTE ADULT - ATTENDING COMMENTS
41 year old male current smoker, no reported PMH presenting with dysuria and chills, currently admitted for sepsis likely 2/2 UTI.     -remains afebrile  -patient clinically improving, reports dysuria has resolved, penile swelling improved, states betamethasone cream has been helping  -urine culture growing Group B strep  -c/w Augmentin, will plan for 7 days treatment total  -C.diff PCR negative  -f/u blood cultures- NGTD  -counseled pt extensively on smoking cessation, dietary changes including reducing sugar and carbohydrate intake, and need to exercise due to elevated BP, A1c of 6.0% and hyperlipidemia  -d/c home today    D/w Dr. Escobar
41 year old male current smoker, no reported PMH presenting with dysuria and chills, currently admitted for sepsis likely 2/2 UTI.     -febrile to 101.6 overnight  -patient clinically improving, reports dysuria has resolved, penile swelling improved, states betamethasone cream has been helping  -urine gonorrhea and chlamydia negative  -urine culture growing Group B strep  -can d/c CTX and doxycycline, c/w Augmentin  -leukocytosis improving, continue to trend  -had 4 episodes of diarrhea yesterday, now resolved, but C.diff PCR sent  -f/u blood cultures- NGTD  -anticipate d/c home tomorrow if patient remains afebrile    D/w Dr. Alfaro

## 2022-02-24 NOTE — DISCHARGE NOTE NURSING/CASE MANAGEMENT/SOCIAL WORK - NSDCPEFALRISK_GEN_ALL_CORE
For information on Fall & Injury Prevention, visit: [email or web address] OR  [email or web address] OR  https://www.cdc.gov/steadi/patient.html

## 2022-02-24 NOTE — PROGRESS NOTE ADULT - PROBLEM SELECTOR PLAN 1
- E. coli would be the most likely cause of UTI in men over 35, however cannot exclude STI at this time especially in a immunocompetent male with no known medical conditions. Will cover for empirically cover for gonorrhea/chlamydia.  - Patient febrile, tachycardic, neutrophil-predominant leukocytosis to 18 with bandemia, lactate 3.7 on admission   - UA showing +leuk esterase and many bacteria   - Ucx: >100k GBS. Bcx NGTD  - HIV negative   - Continue Ceftriaxone 1g qdaily for 7-day course (2/21-2/27). Narrow to Augmentin on discharge    - Continue Doxycycline 100mg BID for 7-day course (2/22-2/28) for empiric chlamydia treatment  - F/u gonorrhea/chlamydia - Ucx: >100k GBS. Bcx NGTD  - HIV, G,C negative   - Switched Ceftriaxone 1g-> daily for 7-day course (2/21-2/27).   - Stop Doxycycline

## 2022-02-24 NOTE — PROGRESS NOTE ADULT - PROBLEM SELECTOR PLAN 2
- Differential includes phimosis vs. balanitis  - Continue betamethasone topical to affected area daily for 4 weeks - Differential includes phimosis vs. balanitis, likely phimosis   - Continue betamethasone topical to affected area daily for 4 weeks prn

## 2022-02-24 NOTE — PROGRESS NOTE ADULT - ASSESSMENT
40yo M active 10-pack year smoker with no known PMH presents with suprapubic pain and dysuria. Admitted for sepsis 2/2 UTI and penile swelling.  41M No PMH p/w suprapubic pain and dysuria admitted for sepsis 2/2 UTI and penile swelling likely phimosis pending dc today.

## 2022-02-26 LAB
CULTURE RESULTS: SIGNIFICANT CHANGE UP
CULTURE RESULTS: SIGNIFICANT CHANGE UP
SPECIMEN SOURCE: SIGNIFICANT CHANGE UP
SPECIMEN SOURCE: SIGNIFICANT CHANGE UP

## 2022-03-12 ENCOUNTER — EMERGENCY (EMERGENCY)
Facility: HOSPITAL | Age: 42
LOS: 1 days | Discharge: HOME | End: 2022-03-12
Attending: EMERGENCY MEDICINE | Admitting: EMERGENCY MEDICINE
Payer: COMMERCIAL

## 2022-03-12 VITALS
DIASTOLIC BLOOD PRESSURE: 100 MMHG | TEMPERATURE: 98 F | SYSTOLIC BLOOD PRESSURE: 155 MMHG | HEART RATE: 88 BPM | RESPIRATION RATE: 17 BRPM | OXYGEN SATURATION: 100 %

## 2022-03-12 LAB
ALBUMIN SERPL ELPH-MCNC: 4.3 G/DL — SIGNIFICANT CHANGE UP (ref 3.3–5)
ALP SERPL-CCNC: 68 U/L — SIGNIFICANT CHANGE UP (ref 40–120)
ALT FLD-CCNC: 23 U/L — SIGNIFICANT CHANGE UP (ref 4–41)
ANION GAP SERPL CALC-SCNC: 15 MMOL/L — HIGH (ref 7–14)
APPEARANCE UR: CLEAR — SIGNIFICANT CHANGE UP
AST SERPL-CCNC: 19 U/L — SIGNIFICANT CHANGE UP (ref 4–40)
BASOPHILS # BLD AUTO: 0.08 K/UL — SIGNIFICANT CHANGE UP (ref 0–0.2)
BASOPHILS NFR BLD AUTO: 1 % — SIGNIFICANT CHANGE UP (ref 0–2)
BILIRUB SERPL-MCNC: 0.3 MG/DL — SIGNIFICANT CHANGE UP (ref 0.2–1.2)
BILIRUB UR-MCNC: NEGATIVE — SIGNIFICANT CHANGE UP
BUN SERPL-MCNC: 12 MG/DL — SIGNIFICANT CHANGE UP (ref 7–23)
CALCIUM SERPL-MCNC: 9.5 MG/DL — SIGNIFICANT CHANGE UP (ref 8.4–10.5)
CHLORIDE SERPL-SCNC: 104 MMOL/L — SIGNIFICANT CHANGE UP (ref 98–107)
CO2 SERPL-SCNC: 21 MMOL/L — LOW (ref 22–31)
COLOR SPEC: COLORLESS — SIGNIFICANT CHANGE UP
CREAT SERPL-MCNC: 0.86 MG/DL — SIGNIFICANT CHANGE UP (ref 0.5–1.3)
DIFF PNL FLD: NEGATIVE — SIGNIFICANT CHANGE UP
EGFR: 110 ML/MIN/1.73M2 — SIGNIFICANT CHANGE UP
EOSINOPHIL # BLD AUTO: 0.11 K/UL — SIGNIFICANT CHANGE UP (ref 0–0.5)
EOSINOPHIL NFR BLD AUTO: 1.3 % — SIGNIFICANT CHANGE UP (ref 0–6)
GLUCOSE SERPL-MCNC: 95 MG/DL — SIGNIFICANT CHANGE UP (ref 70–99)
GLUCOSE UR QL: NEGATIVE — SIGNIFICANT CHANGE UP
HCT VFR BLD CALC: 45.3 % — SIGNIFICANT CHANGE UP (ref 39–50)
HGB BLD-MCNC: 15 G/DL — SIGNIFICANT CHANGE UP (ref 13–17)
IANC: 4.61 K/UL — SIGNIFICANT CHANGE UP (ref 1.5–8.5)
IMM GRANULOCYTES NFR BLD AUTO: 0.4 % — SIGNIFICANT CHANGE UP (ref 0–1.5)
KETONES UR-MCNC: NEGATIVE — SIGNIFICANT CHANGE UP
LEUKOCYTE ESTERASE UR-ACNC: NEGATIVE — SIGNIFICANT CHANGE UP
LYMPHOCYTES # BLD AUTO: 2.73 K/UL — SIGNIFICANT CHANGE UP (ref 1–3.3)
LYMPHOCYTES # BLD AUTO: 33.2 % — SIGNIFICANT CHANGE UP (ref 13–44)
MCHC RBC-ENTMCNC: 29.1 PG — SIGNIFICANT CHANGE UP (ref 27–34)
MCHC RBC-ENTMCNC: 33.1 GM/DL — SIGNIFICANT CHANGE UP (ref 32–36)
MCV RBC AUTO: 88 FL — SIGNIFICANT CHANGE UP (ref 80–100)
MONOCYTES # BLD AUTO: 0.67 K/UL — SIGNIFICANT CHANGE UP (ref 0–0.9)
MONOCYTES NFR BLD AUTO: 8.1 % — SIGNIFICANT CHANGE UP (ref 2–14)
NEUTROPHILS # BLD AUTO: 4.61 K/UL — SIGNIFICANT CHANGE UP (ref 1.8–7.4)
NEUTROPHILS NFR BLD AUTO: 56 % — SIGNIFICANT CHANGE UP (ref 43–77)
NITRITE UR-MCNC: NEGATIVE — SIGNIFICANT CHANGE UP
NRBC # BLD: 0 /100 WBCS — SIGNIFICANT CHANGE UP
NRBC # FLD: 0 K/UL — SIGNIFICANT CHANGE UP
PH UR: 6.5 — SIGNIFICANT CHANGE UP (ref 5–8)
PLATELET # BLD AUTO: 272 K/UL — SIGNIFICANT CHANGE UP (ref 150–400)
POTASSIUM SERPL-MCNC: 4.3 MMOL/L — SIGNIFICANT CHANGE UP (ref 3.5–5.3)
POTASSIUM SERPL-SCNC: 4.3 MMOL/L — SIGNIFICANT CHANGE UP (ref 3.5–5.3)
PROT SERPL-MCNC: 7.3 G/DL — SIGNIFICANT CHANGE UP (ref 6–8.3)
PROT UR-MCNC: NEGATIVE — SIGNIFICANT CHANGE UP
RBC # BLD: 5.15 M/UL — SIGNIFICANT CHANGE UP (ref 4.2–5.8)
RBC # FLD: 13.2 % — SIGNIFICANT CHANGE UP (ref 10.3–14.5)
SODIUM SERPL-SCNC: 140 MMOL/L — SIGNIFICANT CHANGE UP (ref 135–145)
SP GR SPEC: 1.01 — SIGNIFICANT CHANGE UP (ref 1–1.05)
UROBILINOGEN FLD QL: SIGNIFICANT CHANGE UP
WBC # BLD: 8.23 K/UL — SIGNIFICANT CHANGE UP (ref 3.8–10.5)
WBC # FLD AUTO: 8.23 K/UL — SIGNIFICANT CHANGE UP (ref 3.8–10.5)

## 2022-03-12 PROCEDURE — 76770 US EXAM ABDO BACK WALL COMP: CPT | Mod: 26

## 2022-03-12 PROCEDURE — 99285 EMERGENCY DEPT VISIT HI MDM: CPT

## 2022-03-12 NOTE — ED ADULT NURSE NOTE - TEMPLATE LIST FOR HEAD TO TOE ASSESSMENT
[facility]  [facility] [address] [place] [address]  [address] [facility] [address] [phone]      Cardiology Clinic   RN Care Coordinators, Ivonne Gordon (Bre)  [phone]  Pediatric Call Center/Scheduling  [phone]    After Hours and Emergency Contact Number  [phone]  * Ask for the pediatric cardiologist on call         Prescription Renewals  The pharmacy must fax requests to [phone]  * Please allow 3-4 days for prescriptions to be authorized       
General

## 2022-03-12 NOTE — ED ADULT NURSE NOTE - NSIMPLEMENTINTERV_GEN_ALL_ED
Implemented All Universal Safety Interventions:  [place] to call system. Call bell, personal items and telephone within reach. Instruct patient to call for assistance. Room bathroom lighting operational. Non-slip footwear when patient is off stretcher. Physically safe environment: no spills, clutter or unnecessary equipment. Stretcher in lowest position, wheels locked, appropriate side rails in place.

## 2022-03-12 NOTE — ED PROVIDER NOTE - NSFOLLOWUPINSTRUCTIONS_ED_ALL_ED_FT
You were seen in the ER for difficulty urinating. We evaluated you and found that you do have a blockage/stricture in your penis. You were seen by urology and they offered a suprapubic catheter which you declined at this time. However if in the future you are unable to urinate at all, have new abdominal pain, back pain, or fevers you must return to the ER immediately to seek care.     Follow up with a urologist in the office as soon as possible.

## 2022-03-12 NOTE — ED PROVIDER NOTE - CARE PROVIDER_API CALL
Gallo Chan)  Urology  [address] [place] [address], [place] [address]  Phone: [phone]  Fax: [phone]  Follow Up Time:

## 2022-03-12 NOTE — ED PROVIDER NOTE - OBJECTIVE STATEMENT
40 y/o M no pmh, hospitalized for balanitis and febrile UTI 3 weeks ago with sensitive GBS, here now with 2-3 nights of dysuria, frequency, states only a few drops are coming out every 10 minutes. no discharge. no fevers, abd pain, back pain. no known hx of bph. 42 y/o M c recent h/o balanitis and febrile UTI 3 weeks ago with pan-sensitive GBS, here now with 2-3 nights of dysuria, frequency, states only a few drops are coming out every 10 minutes. no discharge. no fevers, abd pain, back pain. no known hx of BPH.

## 2022-03-12 NOTE — ED PROVIDER NOTE - NS ED ROS FT
Constitutional: (-) fever (-) vomiting  Eyes/ENT: (-) vision changes, (-) hearing changes  Cardiovascular: (-) chest pain, (-) wheezing  Respiratory: (-) cough, (-) shortness of breath  Gastrointestinal: (-) vomiting, (-) diarrhea, (-) abdominal pain  : (+) dysuria   Musculoskeletal: (-) back pain  Integumentary: (-) rash, (-) edema  Neurological: (-)loc  Allergic/Immunologic: (-) pruritus  Endocrine: No history of thyroid disease

## 2022-03-12 NOTE — ED PROVIDER NOTE - PATIENT PORTAL LINK FT
You can access the FollowMyHealth Patient Portal offered by [facility] by registering at the following website: [email or web address]. By joining [employer] [facility]’s FollowMyHealth portal, you will also be able to view your health information using other applications (apps) compatible with our system.

## 2022-03-12 NOTE — ED PROVIDER NOTE - CLINICAL SUMMARY MEDICAL DECISION MAKING FREE TEXT BOX
Patient with dysuria and frequency x2-3 days, with sensation of incomplete emptying, no known bph. no fevers, abd pain, back pain. had uti 2-3 weeks ago sensitive GBS which he states resolved with abx. on exam no abd tenderness, no cvat, normal  exam. consideration for new UTI, possible urinary retention component will get UA/UCx and bladder scan likely dc +/- abx and catheter. Patient with dysuria and frequency x2-3 days, with sensation of incomplete emptying, no known bph. Recently tx'd for pyelonephritis. Now with no fevers, abd pain, back pain. had uti 2-3 weeks ago sensitive GBS which he states resolved with abx. on exam no abd tenderness, no cvat, generally normal  exam. consideration for new UTI (possible anatomic abn given recurrence?) possible urinary retention component will get UA/UCx and bladder scan likely dc +/- abx and catheter.

## 2022-03-12 NOTE — ED PROVIDER NOTE - ATTENDING CONTRIBUTION TO CARE
Attending Attestation: Dr. Barlow  I have personally performed a history and physical examination of the patient and discussed management with the resident as well as the patient.  I reviewed the resident's note and agree with the documented findings and plan of care.  I have authored and modified critical sections of the Provider Note, including but not limited to HPI, Physical Exam and MDM. Patient with dysuria and frequency x2-3 days, with sensation of incomplete emptying, no known bph. Recently tx'd for pyelonephritis. Now with no fevers, abd pain, back pain. had uti 2-3 weeks ago sensitive GBS which he states resolved with abx. on exam no abd tenderness, no cvat, generally normal  exam. consideration for new UTI (possible anatomic abn given recurrence?) possible urinary retention component will get UA/UCx and bladder scan likely dc +/- abx and catheter.

## 2022-03-12 NOTE — ED ADULT NURSE NOTE - OBJECTIVE STATEMENT
Pt. presented to room 26. Pt. A&Ox4 ambulatory. Pt. c/o dysuria, frequency and only few drops coming out when he goes to the bathroom. Pt. had recent admission for UTI and was txed. These new symptoms began 2 days ago. Pt. denies CP SOB fever chills abd pain distention. RAC20G labs sent.  Reed to be placed.

## 2022-03-12 NOTE — ED PROVIDER NOTE - PHYSICAL EXAMINATION
Vitals: I have reviewed the patients vital signs  General: Well dressed, well appearing, no acute distress  HEENT: Atraumatic, normocephalic, airway patent  Eyes: EOMI, tracking appropriately  Neck: no tracheal deviation, no JVD  Chest/Lungs: no trauma, symmetric chest rise, speaking in complete sentences, no WOB  Heart: skin and extremities well perfused, regular rate and rhythm  Neuro: A+Ox3, ambulating without difficulty, CN grossly intact  MSK: strength at baseline in all extremities, no muscle wasting or atrophy  Skin: no cyanosis, no jaundice, no new emergent lesions   : uncircumcised, vitligo, no balanitis, no discharge, normal testicular lie.  Abd: SNT no rebound or guarding  Back: no cvat Vitals: I have reviewed the patients vital signs  General: Well dressed, well appearing, no acute distress  HEENT: Atraumatic, normocephalic, airway patent  Eyes: EOMI, tracking appropriately  Neck: no tracheal deviation, no JVD  Chest/Lungs: no trauma, symmetric chest rise, speaking in complete sentences, no WOB  Heart: skin and extremities well perfused, regular rate and rhythm  Neuro: A+Ox3, ambulating without difficulty, CN grossly intact  MSK: strength at baseline in all extremities, no muscle wasting or atrophy  Skin: no cyanosis, no jaundice, no new emergent lesions   : uncircumcised, vitligo, no balanitis, no discharge, normal testicular lie, small degree hypospadias, urethral opening narrow.   Abd: SNT no rebound or guarding  Back: no cvat Vitals: I have reviewed the patients vital signs  General: Well dressed, well appearing, no acute distress  HEENT: Atraumatic, normocephalic, airway patent  Eyes: EOMI, tracking appropriately  Neck: no tracheal deviation, no JVD  Chest/Lungs: no trauma, symmetric chest rise, speaking in complete sentences, no WOB  Heart: skin and extremities well perfused, regular rate and rhythm  Neuro: A+Ox3, ambulating without difficulty, CN grossly intact  MSK: strength at baseline in all extremities, no muscle wasting or atrophy  Skin: no cyanosis, no jaundice, no new emergent lesions   : uncircumcised, vitiligo, no balanitis, no discharge, normal testicular lie, small degree hypospadias, urethral opening narrow.   Abd: SNT no rebound or guarding  Back: no cvat

## 2022-03-13 VITALS
DIASTOLIC BLOOD PRESSURE: 88 MMHG | HEART RATE: 78 BPM | TEMPERATURE: 98 F | RESPIRATION RATE: 16 BRPM | SYSTOLIC BLOOD PRESSURE: 140 MMHG | OXYGEN SATURATION: 100 %

## 2022-03-13 PROBLEM — Z78.9 OTHER SPECIFIED HEALTH STATUS: Chronic | Status: ACTIVE | Noted: 2022-02-21

## 2022-03-13 NOTE — ED POST DISCHARGE NOTE - REASON FOR FOLLOW-UP
Other Lab called : manual diiff on UA cannot be done. It was added on but was added on too late. UCX still pending.

## 2022-03-13 NOTE — PROCEDURE NOTE - ADDITIONAL PROCEDURE DETAILS
Urology contacted for ndiaye catheter placement in setting of urethral stricture. Patient presented with findings concerning for UTI and incomplete emptying. Bladder scan demonstrating 350cc, however patient able to void 50cc per void and comfortable. Catheter attempted by ED, but unsuccessful due to pinpoint urethral meatus.  Attempted staged dilation with 6F through 10F ndiaye catheter, able to insert 6F ndiaye into meatus with moderate resistance. Several stricture felt in penile urethra with advancement of catheter. Repeated with 8F and 10F ndiaye catheter, however unable to advance any catheter past penoscrotal junction. Multiple strictures appreciated. Sterile technique adhered to entirely. Patient unable to tolerate further manipulation and decision was made to abort.    Discussed recommendation for suprapubic tube with patient, however he refuses at this time and states he would prefer to void on his own as he is able to get out enough urine to remain comfortable. Given relatively low PVR, lack of discomfort with incomplete emptying, normal Cr, lack of hydronephrosis, aseptic appearing with normal WBC and afebrile, will defer SPT insertion at this time. We discussed risk of UTI and urinary retention and need to return to ED if patient is unable to void at all and has abdominal distension/bladder fullness as SPT may be warranted. Patient verbalized understanding and agrees with plan.  Remainder of care per ED.    Please have patient followup upon discharge with Dr. Chan.    [facility]  [address] [place] [phone]

## 2022-03-14 LAB
CULTURE RESULTS: SIGNIFICANT CHANGE UP
SPECIMEN SOURCE: SIGNIFICANT CHANGE UP

## 2022-04-20 PROBLEM — Z00.00 ENCOUNTER FOR PREVENTIVE HEALTH EXAMINATION: Status: ACTIVE | Noted: 2022-04-20

## 2022-04-22 ENCOUNTER — APPOINTMENT (OUTPATIENT)
Dept: UROLOGY | Facility: CLINIC | Age: 42
End: 2022-04-22
Payer: COMMERCIAL

## 2022-04-22 ENCOUNTER — APPOINTMENT (OUTPATIENT)
Age: 42
End: 2022-04-22

## 2022-04-22 VITALS
TEMPERATURE: 97.2 F | BODY MASS INDEX: 20.83 KG/M2 | SYSTOLIC BLOOD PRESSURE: 123 MMHG | HEIGHT: 65 IN | WEIGHT: 125 LBS | DIASTOLIC BLOOD PRESSURE: 78 MMHG | RESPIRATION RATE: 17 BRPM | HEART RATE: 92 BPM

## 2022-04-22 DIAGNOSIS — R39.9 UNSPECIFIED SYMPTOMS AND SIGNS INVOLVING THE GENITOURINARY SYSTEM: ICD-10-CM

## 2022-04-22 DIAGNOSIS — F17.200 NICOTINE DEPENDENCE, UNSPECIFIED, UNCOMPLICATED: ICD-10-CM

## 2022-04-22 DIAGNOSIS — Z78.9 OTHER SPECIFIED HEALTH STATUS: ICD-10-CM

## 2022-04-22 DIAGNOSIS — N48.0 LEUKOPLAKIA OF PENIS: ICD-10-CM

## 2022-04-22 DIAGNOSIS — N35.914 UNSPECIFIED ANTERIOR URETHRAL STRICTURE, MALE: ICD-10-CM

## 2022-04-22 PROCEDURE — 99204 OFFICE O/P NEW MOD 45 MIN: CPT

## 2022-04-22 PROCEDURE — 51798 US URINE CAPACITY MEASURE: CPT

## 2022-04-22 NOTE — HISTORY OF PRESENT ILLNESS
[FreeTextEntry1] : 42yo M active 10-pack year smoker with no known PMH presents with difficulty voiding and urethral stricture.  \par \par Recent admission in 2/2022 with suprapubic pain and dysuria. Admitted for sepsis 2/2 UTI and penile swelling. UA positive for leuk esterase and many bacteria. Urine culture growing GBS and blood culture NGTD. HIV and gonorrhea/chlamydia negative. Patient treated with ceftriaxone for UTI and topical betamethasone for phimosis. Patient to be sent home on Augmentin for a total of 7-days antibiotics and topical betamethasone for 2 weeks.\par \par More recently in March 2022, presented to ER with difficulty voiding, in ER, unable to pass ndiaye.   able to only pass 6 Fr and felt multiple penile strictures.  Pt was voiding, but with high residuals 300cc+.  Urine cx neg.\par Pt refused SPT placement.\par \par Since then he is feeling "ok" but still difficulty voiding and weak stream.  He has frequency, urgency.  No incontinence.  Nocturia x2-3.  Reports straining.  Also still having intermittent subjective fever that resolves with OTC tylenol.  No hematuria.  He is fasting now for ramadan, but before he was having q15-20 min DTF.\par \par Denies trauma.  Reports ~10 yrs having UTI episode.  Denies STI.  Denies hx of urethral catheterization or intervention.

## 2022-04-22 NOTE — ASSESSMENT
[FreeTextEntry1] : Patient is a 40 yo M who presents for urethral stricture, BXO/LS.\par \par I had a discussion with the pt about his condition and possible treatment options for his urethral stricture.  We discussed endoscopic management such as internal urethrotomy/dilation with/without intermittent self dilation, and urethroplasty.  Discussed the typical reported success rates with each treatment option.  Discussed given number of endoscopic procedures would recommend urethroplasty as a more definitive option.  Discussed that urethroplasty has the longest durability and highest success rates of ~85%.  Discussed that pending work up most likely would perform a buccal mucosa substitution grafting procedure given he has evidence of penile/meatal stricture and LS/BXO on exam.  Discussed risks/benefits/alternatives of urethroplasty surgery.  Risks including but not limited to bleeding, infection, penile curvature, erectile dysfunction, recurrence of stricture, poor wound healing discussed.  Discussed typical postoperative course and need for catheter for 3 weeks. Discussed with pt dietary restrictions if buccal mucosa harvested.\par \par Will proceed with RUG.

## 2022-04-22 NOTE — PHYSICAL EXAM
[General Appearance - Well Developed] : well developed [Normal Appearance] : normal appearance [General Appearance - Well Nourished] : well nourished [Well Groomed] : well groomed [General Appearance - In No Acute Distress] : no acute distress [Edema] : no peripheral edema [Exaggerated Use Of Accessory Muscles For Inspiration] : no accessory muscle use [Respiration, Rhythm And Depth] : normal respiratory rhythm and effort [Abdomen Soft] : soft [Abdomen Tenderness] : non-tender [Costovertebral Angle Tenderness] : no ~M costovertebral angle tenderness [Penis Abnormality] : normal circumcised penis [Urinary Bladder Findings] : the bladder was normal on palpation [Scrotum] : the scrotum was normal [Epididymis] : the epididymides were normal [Testes Tenderness] : no tenderness of the testes [Testes Mass (___cm)] : there were no testicular masses [No Prostate Nodules] : no prostate nodules [FreeTextEntry1] : pinpoint meatus, whitish changes to glans c/w BXO [Normal Station and Gait] : the gait and station were normal for the patient's age [] : no rash [No Focal Deficits] : no focal deficits [Oriented To Time, Place, And Person] : oriented to person, place, and time [Affect] : the affect was normal [Mood] : the mood was normal [Not Anxious] : not anxious

## 2022-04-25 ENCOUNTER — APPOINTMENT (OUTPATIENT)
Age: 42
End: 2022-04-25

## 2022-04-25 LAB
APPEARANCE: CLEAR
BACTERIA UR CULT: NORMAL
BACTERIA: NEGATIVE
BILIRUBIN URINE: NEGATIVE
BLOOD URINE: NEGATIVE
COLOR: COLORLESS
GLUCOSE QUALITATIVE U: NEGATIVE
HYALINE CASTS: 0 /LPF
KETONES URINE: NEGATIVE
LEUKOCYTE ESTERASE URINE: NEGATIVE
MICROSCOPIC-UA: NORMAL
NITRITE URINE: NEGATIVE
PH URINE: 6.5
PROTEIN URINE: NEGATIVE
RED BLOOD CELLS URINE: 1 /HPF
SPECIFIC GRAVITY URINE: 1.01
SQUAMOUS EPITHELIAL CELLS: 0 /HPF
UROBILINOGEN URINE: NORMAL
WHITE BLOOD CELLS URINE: 1 /HPF

## 2022-04-30 ENCOUNTER — EMERGENCY (EMERGENCY)
Facility: HOSPITAL | Age: 42
LOS: 1 days | Discharge: HOME | End: 2022-04-30
Attending: STUDENT IN AN ORGANIZED HEALTH CARE EDUCATION/TRAINING PROGRAM | Admitting: STUDENT IN AN ORGANIZED HEALTH CARE EDUCATION/TRAINING PROGRAM
Payer: COMMERCIAL

## 2022-04-30 VITALS
TEMPERATURE: 98 F | SYSTOLIC BLOOD PRESSURE: 127 MMHG | OXYGEN SATURATION: 100 % | RESPIRATION RATE: 20 BRPM | HEART RATE: 85 BPM | DIASTOLIC BLOOD PRESSURE: 87 MMHG

## 2022-04-30 PROCEDURE — 99285 EMERGENCY DEPT VISIT HI MDM: CPT | Mod: 25

## 2022-04-30 PROCEDURE — 93010 ELECTROCARDIOGRAM REPORT: CPT

## 2022-04-30 PROCEDURE — 71046 X-RAY EXAM CHEST 2 VIEWS: CPT | Mod: 26

## 2022-04-30 RX ORDER — KETOROLAC TROMETHAMINE 30 MG/ML
15 SYRINGE (ML) INJECTION ONCE
Refills: 0 | Status: DISCONTINUED | OUTPATIENT
Start: 2022-04-30 | End: 2022-04-30

## 2022-04-30 RX ORDER — SODIUM CHLORIDE 9 MG/ML
1000 INJECTION INTRAMUSCULAR; INTRAVENOUS; SUBCUTANEOUS ONCE
Refills: 0 | Status: COMPLETED | OUTPATIENT
Start: 2022-04-30 | End: 2022-04-30

## 2022-04-30 RX ADMIN — SODIUM CHLORIDE 1000 MILLILITER(S): 9 INJECTION INTRAMUSCULAR; INTRAVENOUS; SUBCUTANEOUS at 22:02

## 2022-04-30 RX ADMIN — Medication 15 MILLIGRAM(S): at 22:03

## 2022-04-30 NOTE — ED PROVIDER NOTE - NSFOLLOWUPINSTRUCTIONS_ED_ALL_ED_FT
Rest, drink plenty of fluids.  Advance activity as tolerated.  Continue all previously prescribed medications as directed.  Follow up with your primary care physician and cardiologist in 48-72 hours- bring copies of your results. Call on referral list given for next available appointment. Return to the ER for worsening or persistent symptoms, and/or ANY NEW OR CONCERNING SYMPTOMS. If you have issues obtaining follow up, please call: [phone] to obtain a doctor or specialist who takes your insurance in your area.     Please use 600mg motrin (or advil or ibuprofen) every 6 hours as needed for pain/discomfort/swelling.   Follow up with the [facility] dental clinic as soon as possible.  Call [phone] for an appointment.    Please return to the ER for severe pain, fevers, severe bleeding, increased swelling or any other concerns.   Eat a soft diet and chew on the opposite side.  Brush your teeth several times a day.

## 2022-04-30 NOTE — ED PROVIDER NOTE - PATIENT PORTAL LINK FT
You can access the FollowMyHealth Patient Portal offered by [facility] by registering at the following website: [email or web address]. By joining [facility]’s FollowMyHealth portal, you will also be able to view your health information using other applications (apps) compatible with our system.

## 2022-04-30 NOTE — ED ADULT TRIAGE NOTE - CHIEF COMPLAINT QUOTE
Pt c/o chills, fever, diaphoresis x2 weeks when he breaks his fast.  C/O left facial pain since yesterday that he feels is from his tooth.  C/O left sided CP x1h.  Has been taking tylenol with relief

## 2022-04-30 NOTE — ED PROVIDER NOTE - NS ED ATTENDING STATEMENT MOD
This was a shared visit with the CANDIDO. I reviewed and verified the documentation and independently performed the documented:

## 2022-04-30 NOTE — ED PROVIDER NOTE - CLINICAL SUMMARY MEDICAL DECISION MAKING FREE TEXT BOX
40 yo M, with PMH of recent admission for urosepsis 2/21/22, presents to ER with multiple complaints, c/o woke up this afternoon with left side chest pain, left lower tooth pain yesterday, and still having slow urinary stream a/w subjective fever & chills for the past 2 weeks. well appearing male.     1) The patient's risk factors for ACS were reviewed as well as the EKG.  The CXR assists in r/o Pneumonia, Pneumothorax, Esophageal Tears.  The patient does not appear to have a Pulmonary Embolism based on the Wells Score and there is no apparent DVT.  There is no fever. Plan: labs trop, CXR & reassess.  2) left lower tooth pain, likely dental caries, no signs of abscess: plan: pain control w/ Toradol  3) S/p urosepsis w/ slow urinary stream: r/o UTI. Plan: Ua, Ucx and reassess.

## 2022-04-30 NOTE — ED PROVIDER NOTE - ATTENDING APP SHARED VISIT CONTRIBUTION OF CARE
I have personally performed a face to face medical and diagnostic evaluation of the patient. I have discussed with and reviewed the ACP's note and agree with the History, ROS, Physical Exam and MDM unless otherwise indicated. A brief summary of my personal evaluation and impression can be found below.    40yo M denies pmx p/w multiple c/c:  - since recent uti still having slow urine stream but no inability to compeltely void, dysuria/hematuria, back pain, abd pain, n/v/d, fever, chills  - L lower molar tooth pain. No facial swelling or diff breathing/swallowing.  - 1 day of L sided cp, nonexertional nonpleuritic, significantly improving upon my evaluation. No hx of similar episodes. No other assc sx. No cards w/u recently or family hx of early onset CAD  VITALS: Initial triage and subsequent vitals have been reviewed by me.  Gen: Well appearing, NAD, alert, non-toxic  Head: NCAT  HEENT: MMM, normal conjunctiva, anicteric, neck supple, dental caries no signs of infx/abscess formation, caivty to tooth #17  Lung: CTAB, no adventitious sounds  CV: RRR, no murmurs, 2+symmetric peripheral pulses  Abd: soft, NTND, no rebound or guarding, no palpable masses  MSK: No edema, no visible deformities  Neuro: Moving all extremity grossly, following commands appropriately, fluid speech  Skin: Warm and dry, no evidence of rash  Psych: normal mood and affect  Toothache 2/2 cavity no obvious surrounding infx/abscess will give dental referral for extraction. Atypical cp low suspicion ACS, no risk factors low heart score will check labs, trop/ekg/cxr. Will r/o uti

## 2022-04-30 NOTE — ED ADULT TRIAGE NOTE - WILL THE PATIENT ACCEPT THE PFIZER COVID-19 VACCINE IF ELIGIBLE AND IT IS AVAILABLE?
"Anesthesia Assessment: Preoperative EQUATION    Planned Procedure: Procedure(s) (LRB):  EXCISION, LESION, CHEST WALL (N/A)  Requested Anesthesia Type:General  Surgeon: Temo Brown MD  Service: Thoracic  Known or anticipated Date of Surgery:4/22/2019    Surgeon notes: reviewed and Prominent xiphoid    Previous anesthesia records:4/25/1953-Gfhrcnsgeol-Yyucpq Ear-Round Window Fistula Repair-Right-General-no apparent anesthetic complications    -Complicating Factors: Narrow palate, Anterior larynx     Anesthesia Hx:  No problems with previous Anesthesia      Patient stated he  has had several other surgeries and has had a history of PONV,      Last PCP note: 3-6 months ago , [facility] , focused visit   Subspecialty notes: Pain Management, Bariatrics/ Podiatry  Tests already available:  Results have been reviewed.CXR-4/21/17/ EKG-4/21/17      Plan:    Testing:  Hematology Profile, BMP and T&S        Patient  has previously scheduled Medical Appointment:None    Navigation: Tests Scheduled. Labs-Hem Prof/BMP/T&S on ?(orders are in Epic)             Consults scheduled.POC on ? & [facility] PCP-Dr. Cordova request sent for "[facility]"-PENDING             Results will be tracked by Preop Clinic.                Liz Bradford RN  4/10/19  " Not applicable

## 2022-04-30 NOTE — ED PROVIDER NOTE - CARE PLAN
1 Principal Discharge DX:	Chest pain  Secondary Diagnosis:	Tooth pain  Secondary Diagnosis:	Slow urinary stream

## 2022-04-30 NOTE — ED PROVIDER NOTE - TOOTH FINDINGS
left 2nd molar with small cavity noted, no drainage, no gingival erythema, no edema, no facial swelling, +tender to percussion.

## 2022-04-30 NOTE — ED ADULT NURSE NOTE - OBJECTIVE STATEMENT
pt present with history of lower left sided dental pain for several days, also complains of episode of chest pain this morning at rest , denies assocated symptoms

## 2022-04-30 NOTE — ED PROVIDER NOTE - OBJECTIVE STATEMENT
42 yo M, with PMH of recent admission for urosepsis 2/21/22, presents to ER with multiple complaints, c/o woke up this afternoon with left side chest pain, left lower tooth pain yesterday, and still having slow urinary stream a/w subjective fever & chills for the past 2 weeks. Reports he woke up with chest pain, 6/10, intermittent, non-radiating, no aggravating or relieving factors. Denies similar chest pain in the past. States having severe pain in left lower molar tooth, unable to eat, haven't seen a dentist for 6 yrs, no facial swelling. Reports since the urinary infection, he still having slow urine stream, but no pain, no blood, no back pain. Pt denies any associated symptoms, including n/v/d/c, near syncope, palpitation, cough, sob, weakness, any recent trauma or injury to chest, lower extremity edema. Denies any prior history of DVT/PE, hx of malignancy or known hypercoagulable state.   Denies any early family history of cardiac death or MI.

## 2022-05-01 VITALS
RESPIRATION RATE: 18 BRPM | OXYGEN SATURATION: 100 % | HEART RATE: 84 BPM | DIASTOLIC BLOOD PRESSURE: 88 MMHG | SYSTOLIC BLOOD PRESSURE: 128 MMHG

## 2022-05-01 LAB
ALBUMIN SERPL ELPH-MCNC: 4 G/DL — SIGNIFICANT CHANGE UP (ref 3.3–5)
ALP SERPL-CCNC: 88 U/L — SIGNIFICANT CHANGE UP (ref 40–120)
ALT FLD-CCNC: 20 U/L — SIGNIFICANT CHANGE UP (ref 4–41)
ANION GAP SERPL CALC-SCNC: 17 MMOL/L — HIGH (ref 7–14)
APPEARANCE UR: CLEAR — SIGNIFICANT CHANGE UP
AST SERPL-CCNC: 24 U/L — SIGNIFICANT CHANGE UP (ref 4–40)
BACTERIA # UR AUTO: ABNORMAL
BASOPHILS # BLD AUTO: 0.05 K/UL — SIGNIFICANT CHANGE UP (ref 0–0.2)
BASOPHILS NFR BLD AUTO: 0.4 % — SIGNIFICANT CHANGE UP (ref 0–2)
BILIRUB SERPL-MCNC: 0.4 MG/DL — SIGNIFICANT CHANGE UP (ref 0.2–1.2)
BILIRUB UR-MCNC: NEGATIVE — SIGNIFICANT CHANGE UP
BUN SERPL-MCNC: 7 MG/DL — SIGNIFICANT CHANGE UP (ref 7–23)
CALCIUM SERPL-MCNC: 9.8 MG/DL — SIGNIFICANT CHANGE UP (ref 8.4–10.5)
CHLORIDE SERPL-SCNC: 100 MMOL/L — SIGNIFICANT CHANGE UP (ref 98–107)
CO2 SERPL-SCNC: 19 MMOL/L — LOW (ref 22–31)
COLOR SPEC: SIGNIFICANT CHANGE UP
CREAT SERPL-MCNC: 0.76 MG/DL — SIGNIFICANT CHANGE UP (ref 0.5–1.3)
DIFF PNL FLD: NEGATIVE — SIGNIFICANT CHANGE UP
EGFR: 114 ML/MIN/1.73M2 — SIGNIFICANT CHANGE UP
EOSINOPHIL # BLD AUTO: 0.04 K/UL — SIGNIFICANT CHANGE UP (ref 0–0.5)
EOSINOPHIL NFR BLD AUTO: 0.3 % — SIGNIFICANT CHANGE UP (ref 0–6)
EPI CELLS # UR: 0 /HPF — SIGNIFICANT CHANGE UP (ref 0–5)
FLUAV AG NPH QL: SIGNIFICANT CHANGE UP
FLUBV AG NPH QL: SIGNIFICANT CHANGE UP
GLUCOSE SERPL-MCNC: 88 MG/DL — SIGNIFICANT CHANGE UP (ref 70–99)
GLUCOSE UR QL: NEGATIVE — SIGNIFICANT CHANGE UP
HCT VFR BLD CALC: 44 % — SIGNIFICANT CHANGE UP (ref 39–50)
HGB BLD-MCNC: 14.5 G/DL — SIGNIFICANT CHANGE UP (ref 13–17)
IANC: 10.78 K/UL — HIGH (ref 1.8–7.4)
IMM GRANULOCYTES NFR BLD AUTO: 0.6 % — SIGNIFICANT CHANGE UP (ref 0–1.5)
KETONES UR-MCNC: NEGATIVE — SIGNIFICANT CHANGE UP
LEUKOCYTE ESTERASE UR-ACNC: ABNORMAL
LYMPHOCYTES # BLD AUTO: 14.4 % — SIGNIFICANT CHANGE UP (ref 13–44)
LYMPHOCYTES # BLD AUTO: 2.04 K/UL — SIGNIFICANT CHANGE UP (ref 1–3.3)
MCHC RBC-ENTMCNC: 28.8 PG — SIGNIFICANT CHANGE UP (ref 27–34)
MCHC RBC-ENTMCNC: 33 GM/DL — SIGNIFICANT CHANGE UP (ref 32–36)
MCV RBC AUTO: 87.5 FL — SIGNIFICANT CHANGE UP (ref 80–100)
MONOCYTES # BLD AUTO: 1.13 K/UL — HIGH (ref 0–0.9)
MONOCYTES NFR BLD AUTO: 8 % — SIGNIFICANT CHANGE UP (ref 2–14)
NEUTROPHILS # BLD AUTO: 10.78 K/UL — HIGH (ref 1.8–7.4)
NEUTROPHILS NFR BLD AUTO: 76.3 % — SIGNIFICANT CHANGE UP (ref 43–77)
NITRITE UR-MCNC: NEGATIVE — SIGNIFICANT CHANGE UP
NRBC # BLD: 0 /100 WBCS — SIGNIFICANT CHANGE UP
NRBC # FLD: 0 K/UL — SIGNIFICANT CHANGE UP
PH UR: 7 — SIGNIFICANT CHANGE UP (ref 5–8)
PLATELET # BLD AUTO: 228 K/UL — SIGNIFICANT CHANGE UP (ref 150–400)
POTASSIUM SERPL-MCNC: 3.9 MMOL/L — SIGNIFICANT CHANGE UP (ref 3.5–5.3)
POTASSIUM SERPL-SCNC: 3.9 MMOL/L — SIGNIFICANT CHANGE UP (ref 3.5–5.3)
PROT SERPL-MCNC: 8.4 G/DL — HIGH (ref 6–8.3)
PROT UR-MCNC: ABNORMAL
RBC # BLD: 5.03 M/UL — SIGNIFICANT CHANGE UP (ref 4.2–5.8)
RBC # FLD: 13.2 % — SIGNIFICANT CHANGE UP (ref 10.3–14.5)
RBC CASTS # UR COMP ASSIST: SIGNIFICANT CHANGE UP /HPF (ref 0–4)
RSV RNA NPH QL NAA+NON-PROBE: SIGNIFICANT CHANGE UP
SARS-COV-2 RNA SPEC QL NAA+PROBE: SIGNIFICANT CHANGE UP
SODIUM SERPL-SCNC: 136 MMOL/L — SIGNIFICANT CHANGE UP (ref 135–145)
SP GR SPEC: 1.01 — SIGNIFICANT CHANGE UP (ref 1–1.05)
TROPONIN T, HIGH SENSITIVITY RESULT: 7 NG/L — SIGNIFICANT CHANGE UP
TROPONIN T, HIGH SENSITIVITY RESULT: 8 NG/L — SIGNIFICANT CHANGE UP
UROBILINOGEN FLD QL: SIGNIFICANT CHANGE UP
WBC # BLD: 14.13 K/UL — HIGH (ref 3.8–10.5)
WBC # FLD AUTO: 14.13 K/UL — HIGH (ref 3.8–10.5)
WBC UR QL: SIGNIFICANT CHANGE UP /HPF (ref 0–5)

## 2022-05-02 LAB
CULTURE RESULTS: NO GROWTH — SIGNIFICANT CHANGE UP
SPECIMEN SOURCE: SIGNIFICANT CHANGE UP

## 2022-05-17 ENCOUNTER — APPOINTMENT (OUTPATIENT)
Dept: UROLOGY | Facility: CLINIC | Age: 42
End: 2022-05-17

## 2022-10-05 NOTE — ED PROVIDER NOTE - CARDIOVASCULAR NEGATIVE STATEMENT, MLM
Implemented All Fall Risk Interventions:  [place] to call system. Call bell, personal items and telephone within reach. Instruct patient to call for assistance. Room bathroom lighting operational. Non-slip footwear when patient is off stretcher. Physically safe environment: no spills, clutter or unnecessary equipment. Stretcher in lowest position, wheels locked, appropriate side rails in place. Provide visual cue, wrist band, yellow gown, etc. Monitor gait and stability. Monitor for mental status changes and reorient to person, place, and time. Review medications for side effects contributing to fall risk. Reinforce activity limits and safety measures with patient and family.
no chest pain and no edema.

## 2023-03-30 NOTE — ED PROVIDER NOTE - CONSTITUTIONAL, MLM
Birth Control Pills Counseling: Birth Control Pill Counseling: I discussed with the patient the potential side effects of OCPs including but not limited to increased risk of stroke, heart attack, thrombophlebitis, deep venous thrombosis, hepatic adenomas, breast changes, GI upset, headaches, and depression.  The patient verbalized understanding of the proper use and possible adverse effects of OCPs. All of the patient's questions and concerns were addressed. normal... Well appearing, awake, alert, oriented to person, place, time/situation and in no apparent distress.

## 2024-04-25 NOTE — ED PROVIDER NOTE - PROGRESS NOTE DETAILS
Jose: 350ccs of urine 5 mins post void, will get renal sono for hydro, labs for cr, ndiaye and will need uro f/u. Jose: unable to pass ndiaye, down to size 12 without success. pt appears to have some degree of stricture, uro paged will see patient. Jose: uro unable to pass ndiaye state numerous strictures not amenable to dilation. offered pt suprapubic which he declined. patient is able to void spontaneously and has consistently had a 300-400cc PVR. he is comfortable, in no pain, would like to go home and f/u as outpatient. no infection in his urine today. will give contact info for Dr Chan and strict return precautions Bilateral Helical Rim Advancement Flap Text: The defect edges were debeveled with a #15 blade scalpel.  Given the location of the defect and the proximity to free margins (helical rim) a bilateral helical rim advancement flap was deemed most appropriate.  Using a sterile surgical marker, the appropriate advancement flaps were drawn incorporating the defect and placing the expected incisions between the helical rim and antihelix where possible.  The area thus outlined was incised through and through with a #15 scalpel blade.  With a skin hook and iris scissors, the flaps were gently and sharply undermined and freed up.

## 2024-05-22 NOTE — H&P ADULT - PROBLEM SELECTOR PROBLEM 1
Weight loss.../Inadequate energy intake.../Loss of subcutaneous fat.../Loss of muscle...
Sepsis secondary to UTI

## 2024-12-25 PROBLEM — F10.90 ALCOHOL USE: Status: INACTIVE | Noted: 2022-04-22

## 2025-01-16 ENCOUNTER — APPOINTMENT (OUTPATIENT)
Dept: HUMAN REPRODUCTION | Facility: CLINIC | Age: 45
End: 2025-01-16

## 2025-02-03 ENCOUNTER — APPOINTMENT (OUTPATIENT)
Dept: HUMAN REPRODUCTION | Facility: CLINIC | Age: 45
End: 2025-02-03
Payer: COMMERCIAL

## 2025-02-03 PROCEDURE — 36415 COLL VENOUS BLD VENIPUNCTURE: CPT
